# Patient Record
Sex: MALE | Race: WHITE | NOT HISPANIC OR LATINO | Employment: FULL TIME | URBAN - METROPOLITAN AREA
[De-identification: names, ages, dates, MRNs, and addresses within clinical notes are randomized per-mention and may not be internally consistent; named-entity substitution may affect disease eponyms.]

---

## 2018-06-10 ENCOUNTER — HOSPITAL ENCOUNTER (EMERGENCY)
Facility: HOSPITAL | Age: 48
End: 2018-06-11
Attending: EMERGENCY MEDICINE | Admitting: EMERGENCY MEDICINE
Payer: COMMERCIAL

## 2018-06-10 DIAGNOSIS — R45.851 SUICIDAL THOUGHTS: Primary | ICD-10-CM

## 2018-06-10 DIAGNOSIS — F31.9 BIPOLAR DISORDER (HCC): ICD-10-CM

## 2018-06-10 NOTE — LETTER
Section I - General Information    Name of Patient: Sourav Coffey                 : 1970    Medicare #:____________________  Transport Date: 18 (PCS is valid for round trips on this date and for all repetitive trips in the 60-day range as noted below )  Origin: [place] [address]                                                         Destination:________________________________________________  Is the pt's stay covered under Medicare Part A (PPS/DRG)     (_) YES  (_) NO  Closest appropriate facility?  (_) YES  (_) NO  If no, why is transport to more distant facility required?________________________  If hosp-hosp transfer, describe services needed at 2nd facility not available at 1st facility? _________________________________  If hospice pt, is this transport related to pt's terminal illness? (_) YES (_) NO Describe____________________________________    Section II - Medical Necessity Questionnaire  Ambulance transportation is medically necessary only if other means of transport are contraindicated or would be potentially harmful to the patient  To meet this requirement, the patient must either be "bed confined" or suffer from a condition such that transport by means other than ambulance is contraindicated by the patient's condition   The following questions must be answered by the medical professional signing below for this form to be valid:    1)  Describe the MEDICAL CONDITION (physical and/or mental) of this patient AT [address] [place] [address] that requires the patient to be transported in an ambulance and why transport by other means is contraindicated by the patient's condition:__________________________________________________________________________________________________    2) Is the patient "bed confined" as defined below?     (_) YES  (_) NO  To be "be confined" the patient must satisfy all three of the following conditions: (1) unable to get up from bed without Assistance; AND (2) unable to ambulate; AND (3) unable to sit in a chair or wheelchair  3) Can this patient safely be transported by car or wheelchair Rohan Corado (i e , seated during transport without a medical attendant or monitoring)?   (_) YES  (_) NO    4) In addition to completing questions 1-3 above, please check any of the following conditions that apply*:  *Note: supporting documentation for any boxes checked must be maintained in the patient's medical records  (_)Contractures   (_)Non-Healed Fractures  (_)Patient is confused (_)Patient is comatose (_)Moderate/severe pain on movement (_)Danger to self/others  (_)IV meds/fluids required (_)Patient is combative(_)Need or possible need for restraints (_)DVT requires elevation of lower extremity  (_)Medical attendant required (_)Requires oxygen-unable to self administer (_)Special handling/isolation/infection control precautions required (_)Unable to tolerate seated position for time needed to transport (_)Hemodynamic monitoring required en route (_)Unable to sit in a chair or wheelchair due to decubitus ulcers or other wounds (_)Cardiac monitoring required en route (_)Morbid obesity requires additional personnel/equipment to safely handle patient (_)Orthopedic device (backboard, halo, pins, traction, brace, wedge, etc,) requiring special handling during transport (_)Other(specify)_______________________________________________    Section III - Signature of Physician or Healthcare Professional  I certify that the above information is true and correct based on my evaluation of this patient, and represent that the patient requires transport by ambulance and that other forms of transport are contraindicated   I understand that this information will be used by the Centers for Medicare and Medicaid Services (CMS) to support the determination of medical necessity for ambulance services, and I represent that I have personal knowledge of the patient's condition at time of transport  (_) If this box is checked, I also certify that the patient is physically or mentally incapable of signing the ambulance service's claim and that the institution with which I am affiliated has furnished care, services, or assistance to the patient  My signature below is made on behalf of the patient pursuant to 42 CFR §424 36(b)(4)  In accordance with 42 CFR §424 37, the specific reason(s) that the patient is physically or mentally incapable of signing the claim form is as follows: _________________________________________________________________________________________________________      Signature of Physician* or Healthcare Professional______________________________________________________________  Signature Date 06/11/18 (For scheduled repetitive transports, this form is not valid for transports performed more than 60 days after this date)    Printed Name & Credentials of Physician or Healthcare Professional (MD, DO, RN, etc )________________________________  *Form must be signed by patient's attending physician for scheduled, repetitive transports   For non-repetitive, unscheduled ambulance transports, if unable to obtain the signature of the attending physician, any of the following may sign (choose appropriate option below)  (_) Physician Assistant (_)  Clinical Nurse Specialist (_)  Registered Nurse  (_)  Nurse Practitioner  (_) Discharge Planner

## 2018-06-10 NOTE — LETTER
[facility]  [address] [address]  Dept: [phone]      EMTALA TRANSFER CONSENT    NAME Latosha Alfred                                         1970                              MRN [id number]    I have been informed of my rights regarding examination, treatment, and transfer   by Dr Camilla Lee DO    Benefits: Specialized equipment and/or services available at the receiving facility (Include comment)________________________    Risks: Potential for delay in receiving treatment, Increased discomfort during transfer      Consent for Transfer:  I acknowledge that my medical condition has been evaluated and explained to me by the emergency department physician or other qualified medical person and/or my attending physician, who has recommended that I be transferred to the service of  Accepting Physician: DR Shubham Galicia at [address] [place] [address] Name, [address] : [facility] (SL[facility])  The above potential benefits of such transfer, the potential risks associated with such transfer, and the probable risks of not being transferred have been explained to me, and I fully understand them  The doctor has explained that, in my case, the benefits of transfer outweigh the risks  I agree to be transferred  I authorize the performance of emergency medical procedures and treatments upon me in both transit and upon arrival at the receiving facility  Additionally, I authorize the release of any and all medical records to the receiving facility and request they be transported with me, if possible  I understand that the safest mode of transportation during a medical emergency is an ambulance and that the Hospital advocates the use of this mode of transport   Risks of traveling to the receiving facility by car, including absence of medical control, life sustaining equipment, such as oxygen, and medical personnel has been explained to me and I fully understand them     ([facility])  [  ]  I consent to the stated transfer and to be transported by ambulance/helicopter  [  ]  I consent to the stated transfer, but refuse transportation by ambulance and accept full responsibility for my transportation by car  I understand the risks of non-ambulance transfers and I exonerate the Hospital and its staff from any deterioration in my condition that results from this refusal     X___________________________________________    DATE  18  TIME________  Signature of patient or legally responsible individual signing on patient behalf           RELATIONSHIP TO PATIENT_________________________          Provider Certification    NAME Erna Johns                                         1970                              MRN [id number]    A medical screening exam was performed on the above named patient  Based on the examination:    Condition Necessitating Transfer The primary encounter diagnosis was Suicidal thoughts  A diagnosis of Bipolar disorder ([facility] [address] ) was also pertinent to this visit  Patient Condition: The patient has been stabilized such that within reasonable medical probability, no material deterioration of the patient condition or the condition of the unborn child(sylvester) is likely to result from the transfer    Reason for Transfer: Level of Care needed not available at this facility    Transfer Requirements: Rick Blair (SP7)   · Space available and qualified personnel available for treatment as acknowledged by [facility] ([address])  · Agreed to accept transfer and to provide appropriate medical treatment as acknowledged by       DR Raisa Alvarado  · Appropriate medical records of the examination and treatment of the patient are provided at the time of transfer   [address] _______  · Transfer will be performed by qualified personnel from [facility]  and appropriate transfer equipment as required, including the use of necessary and appropriate life support measures  Provider Certification: I have examined the patient and explained the following risks and benefits of being transferred/refusing transfer to the patient/family:  General risk, such as traffic hazards, adverse weather conditions, rough terrain or turbulence, possible failure of equipment (including vehicle or aircraft), or consequences of actions of persons outside the control of the transport personnel      Based on these reasonable risks and benefits to the patient and/or the unborn child(sylvester), and based upon the information available at the time of the patients examination, I certify that the medical benefits reasonably to be expected from the provision of appropriate medical treatments at another medical facility outweigh the increasing risks, if any, to the individuals medical condition, and in the case of labor to the unborn child, from effecting the transfer      X____________________________________________ DATE 06/11/18        TIME_______      ORIGINAL - SEND TO MEDICAL RECORDS   COPY - SEND WITH PATIENT DURING TRANSFER

## 2018-06-11 ENCOUNTER — HOSPITAL ENCOUNTER (INPATIENT)
Facility: HOSPITAL | Age: 48
LOS: 4 days | Discharge: HOME | DRG: 885 | End: 2018-06-15
Attending: PSYCHIATRY & NEUROLOGY | Admitting: PSYCHIATRY & NEUROLOGY
Payer: COMMERCIAL

## 2018-06-11 VITALS
RESPIRATION RATE: 16 BRPM | SYSTOLIC BLOOD PRESSURE: 120 MMHG | HEART RATE: 77 BPM | OXYGEN SATURATION: 98 % | DIASTOLIC BLOOD PRESSURE: 82 MMHG | TEMPERATURE: 97.7 F

## 2018-06-11 DIAGNOSIS — F31.9 BIPOLAR DISORDER (HCC): ICD-10-CM

## 2018-06-11 DIAGNOSIS — F32.2 MDD (MAJOR DEPRESSIVE DISORDER), SINGLE EPISODE, SEVERE , NO PSYCHOSIS (HCC): Primary | ICD-10-CM

## 2018-06-11 DIAGNOSIS — F17.200 NICOTINE DEPENDENCE: ICD-10-CM

## 2018-06-11 LAB
AMPHETAMINES SERPL QL SCN: NEGATIVE
BARBITURATES UR QL: NEGATIVE
BENZODIAZ UR QL: NEGATIVE
COCAINE UR QL: NEGATIVE
ETHANOL EXG-MCNC: 0 MG/DL
METHADONE UR QL: NEGATIVE
OPIATES UR QL SCN: NEGATIVE
PCP UR QL: NEGATIVE
THC UR QL: NEGATIVE

## 2018-06-11 PROCEDURE — 99285 EMERGENCY DEPT VISIT HI MDM: CPT

## 2018-06-11 PROCEDURE — 80307 DRUG TEST PRSMV CHEM ANLYZR: CPT | Performed by: NURSE PRACTITIONER

## 2018-06-11 PROCEDURE — 82075 ASSAY OF BREATH ETHANOL: CPT | Performed by: NURSE PRACTITIONER

## 2018-06-11 RX ORDER — BENZTROPINE MESYLATE 1 MG/ML
1 INJECTION INTRAMUSCULAR; INTRAVENOUS EVERY 6 HOURS PRN
Status: CANCELLED | OUTPATIENT
Start: 2018-06-11

## 2018-06-11 RX ORDER — RISPERIDONE 1 MG/1
1 TABLET, ORALLY DISINTEGRATING ORAL
Status: DISCONTINUED | OUTPATIENT
Start: 2018-06-11 | End: 2018-06-15 | Stop reason: HOSPADM

## 2018-06-11 RX ORDER — TRAZODONE HYDROCHLORIDE 50 MG/1
50 TABLET ORAL
Status: DISCONTINUED | OUTPATIENT
Start: 2018-06-11 | End: 2018-06-15 | Stop reason: HOSPADM

## 2018-06-11 RX ORDER — MAGNESIUM HYDROXIDE/ALUMINUM HYDROXICE/SIMETHICONE 120; 1200; 1200 MG/30ML; MG/30ML; MG/30ML
20 SUSPENSION ORAL EVERY 4 HOURS PRN
Status: CANCELLED | OUTPATIENT
Start: 2018-06-11

## 2018-06-11 RX ORDER — NICOTINE 21 MG/24HR
21 PATCH, TRANSDERMAL 24 HOURS TRANSDERMAL ONCE
Status: DISCONTINUED | OUTPATIENT
Start: 2018-06-11 | End: 2018-06-11 | Stop reason: HOSPADM

## 2018-06-11 RX ORDER — HYDROXYZINE HYDROCHLORIDE 25 MG/1
25 TABLET, FILM COATED ORAL EVERY 6 HOURS PRN
Status: DISCONTINUED | OUTPATIENT
Start: 2018-06-11 | End: 2018-06-15 | Stop reason: HOSPADM

## 2018-06-11 RX ORDER — ACETAMINOPHEN 325 MG/1
650 TABLET ORAL ONCE
Status: COMPLETED | OUTPATIENT
Start: 2018-06-11 | End: 2018-06-11

## 2018-06-11 RX ORDER — HYDROXYZINE HYDROCHLORIDE 25 MG/1
25 TABLET, FILM COATED ORAL EVERY 6 HOURS PRN
Status: CANCELLED | OUTPATIENT
Start: 2018-06-11

## 2018-06-11 RX ORDER — LORAZEPAM 2 MG/ML
1 INJECTION INTRAMUSCULAR EVERY 6 HOURS PRN
Status: CANCELLED | OUTPATIENT
Start: 2018-06-11

## 2018-06-11 RX ORDER — LORAZEPAM 1 MG/1
1 TABLET ORAL EVERY 8 HOURS PRN
Status: CANCELLED | OUTPATIENT
Start: 2018-06-11

## 2018-06-11 RX ORDER — TRAZODONE HYDROCHLORIDE 50 MG/1
50 TABLET ORAL
Status: CANCELLED | OUTPATIENT
Start: 2018-06-11

## 2018-06-11 RX ORDER — BENZTROPINE MESYLATE 1 MG/ML
1 INJECTION INTRAMUSCULAR; INTRAVENOUS EVERY 6 HOURS PRN
Status: DISCONTINUED | OUTPATIENT
Start: 2018-06-11 | End: 2018-06-15 | Stop reason: HOSPADM

## 2018-06-11 RX ORDER — OLANZAPINE 10 MG/1
10 TABLET ORAL
Status: DISCONTINUED | OUTPATIENT
Start: 2018-06-11 | End: 2018-06-15 | Stop reason: HOSPADM

## 2018-06-11 RX ORDER — RISPERIDONE 1 MG/1
1 TABLET, ORALLY DISINTEGRATING ORAL
Status: CANCELLED | OUTPATIENT
Start: 2018-06-11

## 2018-06-11 RX ORDER — LORAZEPAM 2 MG/ML
1 INJECTION INTRAMUSCULAR EVERY 6 HOURS PRN
Status: DISCONTINUED | OUTPATIENT
Start: 2018-06-11 | End: 2018-06-15 | Stop reason: HOSPADM

## 2018-06-11 RX ORDER — ACETAMINOPHEN 325 MG/1
650 TABLET ORAL EVERY 6 HOURS PRN
Status: CANCELLED | OUTPATIENT
Start: 2018-06-11

## 2018-06-11 RX ORDER — OLANZAPINE 10 MG/1
10 TABLET ORAL
Status: CANCELLED | OUTPATIENT
Start: 2018-06-11

## 2018-06-11 RX ORDER — HALOPERIDOL 5 MG
5 TABLET ORAL EVERY 8 HOURS PRN
Status: CANCELLED | OUTPATIENT
Start: 2018-06-11

## 2018-06-11 RX ORDER — OLANZAPINE 10 MG/1
10 INJECTION, POWDER, LYOPHILIZED, FOR SOLUTION INTRAMUSCULAR
Status: DISCONTINUED | OUTPATIENT
Start: 2018-06-11 | End: 2018-06-15 | Stop reason: HOSPADM

## 2018-06-11 RX ORDER — HALOPERIDOL 5 MG
5 TABLET ORAL EVERY 8 HOURS PRN
Status: DISCONTINUED | OUTPATIENT
Start: 2018-06-11 | End: 2018-06-15 | Stop reason: HOSPADM

## 2018-06-11 RX ORDER — BENZTROPINE MESYLATE 0.5 MG/1
1 TABLET ORAL EVERY 6 HOURS PRN
Status: CANCELLED | OUTPATIENT
Start: 2018-06-11

## 2018-06-11 RX ORDER — IBUPROFEN 600 MG/1
600 TABLET ORAL EVERY 8 HOURS PRN
Status: CANCELLED | OUTPATIENT
Start: 2018-06-11

## 2018-06-11 RX ORDER — IBUPROFEN 400 MG/1
800 TABLET ORAL EVERY 8 HOURS PRN
Status: DISCONTINUED | OUTPATIENT
Start: 2018-06-11 | End: 2018-06-15 | Stop reason: HOSPADM

## 2018-06-11 RX ORDER — ACETAMINOPHEN 325 MG/1
650 TABLET ORAL EVERY 6 HOURS PRN
Status: DISCONTINUED | OUTPATIENT
Start: 2018-06-11 | End: 2018-06-15 | Stop reason: HOSPADM

## 2018-06-11 RX ORDER — OLANZAPINE 10 MG/1
10 INJECTION, POWDER, LYOPHILIZED, FOR SOLUTION INTRAMUSCULAR
Status: CANCELLED | OUTPATIENT
Start: 2018-06-11

## 2018-06-11 RX ORDER — LORAZEPAM 1 MG/1
1 TABLET ORAL EVERY 8 HOURS PRN
Status: DISCONTINUED | OUTPATIENT
Start: 2018-06-11 | End: 2018-06-15 | Stop reason: HOSPADM

## 2018-06-11 RX ORDER — IBUPROFEN 600 MG/1
600 TABLET ORAL EVERY 8 HOURS PRN
Status: DISCONTINUED | OUTPATIENT
Start: 2018-06-11 | End: 2018-06-15 | Stop reason: HOSPADM

## 2018-06-11 RX ORDER — IBUPROFEN 400 MG/1
800 TABLET ORAL EVERY 8 HOURS PRN
Status: CANCELLED | OUTPATIENT
Start: 2018-06-11

## 2018-06-11 RX ORDER — HALOPERIDOL 5 MG/ML
5 INJECTION INTRAMUSCULAR EVERY 6 HOURS PRN
Status: DISCONTINUED | OUTPATIENT
Start: 2018-06-11 | End: 2018-06-15 | Stop reason: HOSPADM

## 2018-06-11 RX ORDER — MAGNESIUM HYDROXIDE/ALUMINUM HYDROXICE/SIMETHICONE 120; 1200; 1200 MG/30ML; MG/30ML; MG/30ML
20 SUSPENSION ORAL EVERY 4 HOURS PRN
Status: DISCONTINUED | OUTPATIENT
Start: 2018-06-11 | End: 2018-06-15 | Stop reason: HOSPADM

## 2018-06-11 RX ORDER — HALOPERIDOL 5 MG/ML
5 INJECTION INTRAMUSCULAR EVERY 6 HOURS PRN
Status: CANCELLED | OUTPATIENT
Start: 2018-06-11

## 2018-06-11 RX ORDER — BENZTROPINE MESYLATE 1 MG/1
1 TABLET ORAL EVERY 6 HOURS PRN
Status: DISCONTINUED | OUTPATIENT
Start: 2018-06-11 | End: 2018-06-15 | Stop reason: HOSPADM

## 2018-06-11 RX ADMIN — ACETAMINOPHEN 650 MG: 325 TABLET ORAL at 21:40

## 2018-06-11 RX ADMIN — ACETAMINOPHEN 650 MG: 325 TABLET ORAL at 09:23

## 2018-06-11 RX ADMIN — NICOTINE POLACRILEX 2 MG: 2 GUM, CHEWING ORAL at 21:44

## 2018-06-11 RX ADMIN — NICOTINE 21 MG: 21 PATCH, EXTENDED RELEASE TRANSDERMAL at 01:05

## 2018-06-11 NOTE — ED NOTES
Spoke with [facility] who states there are no available beds at this time and another patient was not discharged as anticipated

## 2018-06-11 NOTE — ED NOTES
Patient was brought to the ED tonight by [employer] who were called after pt's girlfriend reported that he made suicidal threats in a text message earlier  He reports that he and his wife are  and haven't spoken in a long time  He says he was frustrated by this and sent a text to his ex gf saying that he was going to get a gun and do what a friend did  The friend reportedly shot himself  Pt reports having a gun that is being kept in [place]  He denies feeling suicidal now, and also denies HI and psychosis  He says he has no mental health history since high school

## 2018-06-11 NOTE — DISCHARGE INSTRUCTIONS
Suicide Prevention for Adults   WHAT YOU NEED TO KNOW:   A person may see suicide as the only way to escape emotional or physical pain and suffering  You can help provide emotional support for him or her and get the help he or she needs  Learn to recognize warning signs that the person may be considering suicide  Find resources to help prevent him or her from attempting to take his or her life  DISCHARGE INSTRUCTIONS:   Call 911 if:   · The person has done something on purpose to hurt himself or herself  · The person tries to commit suicide  Return to the emergency department if:   · The person tells you he or she made a plan to commit suicide  · The person acts out in anger, is reckless, or is abusing alcohol or drugs  · The person has serious thoughts of suicide, even with treatment  Contact the person's healthcare provider or therapist if:   · You begin to see warning signs that the person may be considering suicide  · The person has intense feelings of sadness, anger, revenge, or despair, or he cannot make decisions easily  · The person tells you he or she has more thoughts of suicide when they are alone  · The person withdraws from others  · The person stops eating, or begins to smoke or drink heavily  · The person feels he or she is a burden because of a disability or disease  · The person has trouble dealing with stress, such as a breakup or a job loss  · You have questions or concerns about the person's condition or care  What to do if the person is having thoughts of suicide:  Call 915 if you feel the person is at immediate risk of suicide, or if he or she talks about an active suicide plan  Assume that the person intends to carry out his or her plan  Resources are available to help you and the person  The following are some things you can do:  · Call the [address] [place] [address] at 5-321-386-TALK ([phone])       · Call the Suicide Hotline at 3-649-BEKMDKL (1-260.239.3191)   · Contact the person's therapist  His or her healthcare provider can give you a list of therapists if he or she does not have one  · Keep medicines, weapons, and alcohol out of the person's reach  Do not leave the person alone if he or she says they want to commit suicide  Do not leave the person alone if you think he or she may try it  Make sure you do not put yourself at risk if the person has a weapon  · Do not be afraid to ask if the person is thinking of ending his or her life  Ask if the person has a plan for hurting or killing himself or herself  Warning signs to watch for:   · Talking about a plan for committing suicide, or suddenly deciding to make a will    · Cutting himself or herself, burning the skin with cigarettes, or driving recklessly    · Drug or alcohol use, not taking prescribed medicine, or taking too much prescribed medicine    · Sudden anger, lashing out at others, or seeming hopeless, anxious, or angry and then suddenly becoming happy or peaceful    · Not wanting to spend time with others or doing things he or she usually enjoys    · Trouble at work, or not showing up for work    · A change in the way he or she eats, sleeps, or dresses    · Weight gain or loss or having less energy than usual    · Trouble sleeping or spending a lot of time sleeping    · Giving away or throwing away his or her belongings  Follow up with the person's healthcare provider and therapist as directed:  Write down your questions so you remember to ask them during your visits  Treatments the person may need:   · Medicines  may be given to prevent mood swings, or to decrease anxiety or depression  The person will need to take all medicines as directed  A sudden stop can be harmful  It may take 4 to 6 weeks for the medicine to help him or her feel better  · A therapist  can help the person identify and change negative feelings or beliefs about himself or herself   This may also help change the way the he or she feels and acts  A therapist can also help the person find ways to cope with things that cannot be changed  What you can do to help the person:   · Encourage the person to seek help for drug or alcohol abuse  Drugs and alcohol can increase suicidal thoughts and make the person more likely to act on them  · Help the person connect with others  Encourage him or her to become involved in the community  Some examples include tutoring a young student, volunteering at a [employer], or joining a group exercise program  The person may need help setting up a computer or creating an e-mail account to help him or her remain connected to others  · Exercise with the person  Exercise can lift his or her mood, increase energy, and make it easier to sleep at night  · Encourage the person to try new things  Adults who are open to new experiences handle stress and change better than those who are not  · Call, visit, or send postcards to the person often  Check on him or her after the loss of a pet, longtime friend, or child  Holidays, birthdays, and anniversaries can be difficult for a person after a loss  The loss of a spouse can be especially painful and lonely  · Help the person schedule a visit with his or her Druze or spiritual leader  A Druze or spiritual leader may be able to offer additional support and resources to the person  · Help the person get equipment that will increase his or her comfort and mobility  Examples are hearing aids, glasses, large print books, and walkers  These can help him or her enjoy activities and feel more independent  · Encourage the person to continue taking medicine and going to therapy  Medicine and therapy can help improve his or her mental health    For support and more information:   · [address] [place] [address] , [address] [place] [address]  Phone: 1- 450 - 860-MCAO 03 51 58 72 24)  Web Address: [employer] be  org  · Suicide Awareness Voices of Education  [address] , [address]  Phone: 0- 906 - 307-2109  Web Address: [employer] br  org  © 2017 [address] [employer] [employer] Information is for End User's use only and may not be sold, redistributed or otherwise used for commercial purposes  All illustrations and images included in CareNotes® are the copyrighted property of [employer] , [employer]  or [employer]  The above information is an  only  It is not intended as medical advice for individual conditions or treatments  Talk to your doctor, nurse or pharmacist before following any medical regimen to see if it is safe and effective for you

## 2018-06-11 NOTE — ED NOTES
Crisis worker at [address] [place] [address], [address] [place] [address]  06/11/18 5440

## 2018-06-11 NOTE — ED NOTES
Patient provided with call bell to watch TV, patient is 1:1 continuous observation        Vishal Ling RN  06/11/18 2309

## 2018-06-11 NOTE — ED NOTES
Verbal consent obtained from patient, to talk to patients wife, Heladio Medina looking for update on patient  RN able to provide Heladio Medina with update at this time  Heladio Medina not interested in talking with patient        Liliana Giraldo RN  06/11/18 0612

## 2018-06-11 NOTE — ED NOTES
Verbal consent obtained from patient to talk to patients daughter  Patients daughter looking for update at this time  Provided daughter with update  Daughter is also not interested in speaking with patient at this time        Liliana Giraldo RN  06/11/18 2793

## 2018-06-11 NOTE — ED NOTES
Patient is accepted at [address] [facility])   Patient is accepted by Dr Shelle Dakins per [facility])    Transportation is arranged with [facility]  Transportation is scheduled for 06/11/2018 @1900  Patient may go to the floor at [address] [facility] [facility])  Nurse report is to be called to [phone] prior to patient transfer      [address] [place] [address] Worker

## 2018-06-11 NOTE — ED NOTES
Called the number for admissions on the back of the patient's insurance card and received an automated message stating the office was closed, and would reopen at 08:30  Preauthorization for hospitaliztion could not be obtained  Another call will need to be placed to [phone] after 08:30 to obtain authorization for acute inpatient mental health treatment

## 2018-06-11 NOTE — ED ATTENDING ATTESTATION
Bradley Preciado DO, saw and evaluated the patient  I have discussed the patient with the resident/non-physician practitioner and agree with the resident's/non-physician practitioner's findings, Plan of Care, and MDM as documented in the resident's/non-physician practitioner's note, except where noted  All available labs and Radiology studies were reviewed  At this point I agree with the current assessment done in the Emergency Department  I have conducted an independent evaluation of this patient a history and physical is as follows:    Patient is a 52-year-old male that presents for a psychiatric evaluation tonight  Crisis was called after somebody called the police tonight because of a text message  He states that he has been cheating on his wife and he sent a text message to his girlfriend tonight stating that he but a gun and was tired of everything and made a threat about doing something that was similar to what another friend did  That other friend shot himself  Police arrived along with crisis  Patient's daughter and girlfriend were willing to sign a [id number] but the patient was brought to the hospital 1st for an evaluation  According to the police he never stated that he would sign a 201  A petition was never taken from crisis  We are unable to get a hold of the patient's girlfriend or his daughter  Patient states that he is not suicidal but does admit to making these text messages  Patient is alert and oriented x3  He has a blunted affect  He denies any suicidal or homicidal ideation  He has no plan  States that he said when he said in anger  He does not appear impaired at the time  He is in no respiratory distress  Has a regular heart rate and heart sounds  Neurologically is grossly intact  We had a long discussion with the patient about our concerns  We are unable to get hold of the patient's daughter or girlfriend    Given his text message that we are able to read on his phone is concerning enough that we would support a 302  We explained this to him  He understands and is willing to sign a 201  Will have crisis speak with him again here from the emergency department to sign a 201 and arrange placement    Critical Care Time  CritCare Time    Procedures

## 2018-06-11 NOTE — ED NOTES
Patient signed a 12  Bed search was unable to locate an appropriate inpatient provider   The following facilities have no available bed tonight:  [facility], [facility], [facility], [facility],  [facility] [place], [facility], [facility]

## 2018-06-11 NOTE — ED NOTES
Per Stacia Loud, patients wife, patient very manipulative and "playing his games again " Patient called and left voicemail saying that patient "pretended to be a nurse" by stating that "her  was in [facility] and she needed to call back and speak with a nurse because he is being transferred to [facility]      Wale Baez RN  06/11/18 4837

## 2018-06-11 NOTE — ED PROVIDER NOTES
History  Chief Complaint   Patient presents with    Psychiatric Evaluation     Patient reports depression for 2 months  States texted wife today stating that he purchased a gun and might do something stupid if he isnt able to talk to her  States sometimes SI but I dont feel like I would do it  Denies plan, HI/AH/VH  This is a 56year old male who states that he and his wife have not spoken in the last 2 months as their marriage is stressed  He states that he went to "the courthouse to take a polygraph test and this set everything off"  He states that earlier he sent an instant message to his ex girl friend "kelsi"  His message stated that "I bought a gun, I cant take this anymore, I am going to do something stupid like _________ did"  Pt states that crisis and the police arrived at his house  Pt states that he was told by crisis either "he can go willingly or they can force him to go"  Pt states that he was not told that he had the option of signing himself in or had to be committed  Pt brought to ED by [employer]  Crisis states they received phone call that pt daughter could sign 302   302 was not done while crisis was at home  Pt denies that he is suicidal  He states that his comment was "spur of the moment"  He states he went to bed, cooled off and there was nothing to it  He states that the gun is "in [place]"  He denies that anyone knows where it is located  History provided by:  Patient   used: No    Psychiatric Evaluation   Presenting symptoms: suicidal thoughts and suicidal threats    Presenting symptoms: no suicide attempt    Patient accompanied by:  Law enforcement      Prior to Admission Medications   Prescriptions Last Dose Informant Patient Reported? Taking?   naproxen (NAPROSYN) 500 mg tablet   No No   Sig: Take 1 tablet (500 mg total) by mouth 3 (three) times a day with meals        Facility-Administered Medications: None       History reviewed  No pertinent past medical history  Past Surgical History:   Procedure Laterality Date    EYE SURGERY Left     HERNIA REPAIR         History reviewed  No pertinent family history  I have reviewed and agree with the history as documented  Social History   Substance Use Topics    Smoking status: Current Every Day Smoker     Packs/day: 2 00    Smokeless tobacco: Never Used    Alcohol use No        Review of Systems   Psychiatric/Behavioral: Positive for suicidal ideas  All other systems reviewed and are negative  Physical Exam  Physical Exam   Constitutional: He is oriented to person, place, and time  He appears well-developed and well-nourished  No distress  HENT:   Head: Normocephalic and atraumatic  Eyes: EOM are normal  Pupils are equal, round, and reactive to light  Neck: Normal range of motion  Neck supple  Cardiovascular: Normal rate, regular rhythm and normal heart sounds  Pulmonary/Chest: Effort normal and breath sounds normal    Abdominal: Soft  Bowel sounds are normal  He exhibits no distension  There is no tenderness  Musculoskeletal: Normal range of motion  Neurological: He is alert and oriented to person, place, and time  He displays normal reflexes  No cranial nerve deficit or sensory deficit  He exhibits normal muscle tone  Coordination normal    Skin: Skin is warm and dry  Capillary refill takes less than 2 seconds  He is not diaphoretic  Psychiatric:   Pt denies SI at this time  Passive - regarding situation and consequences    Nursing note and vitals reviewed        Vital Signs  ED Triage Vitals [06/10/18 2355]   Temperature Pulse Respirations Blood Pressure SpO2   98 6 °F (37 °C) 66 18 145/93 97 %      Temp Source Heart Rate Source Patient Position - Orthostatic VS BP Location FiO2 (%)   Oral Monitor Sitting Left arm --      Pain Score       No Pain           Vitals:    06/10/18 2355   BP: 145/93   Pulse: 66   Patient Position - Orthostatic VS: Sitting Visual Acuity      ED Medications  Medications   nicotine (NICODERM CQ) 21 mg/24 hr TD 24 hr patch 21 mg (21 mg Transdermal Medication Applied 6/11/18 0105)       Diagnostic Studies  Results Reviewed     Procedure Component Value Units Date/Time    Rapid drug screen, urine [[id number]]  (Normal) Collected:  06/11/18 0059    Lab Status:  Final result Specimen:  Urine from Urine, Clean Catch Updated:  06/11/18 0119     Amph/Meth UR Negative     Barbiturate Ur Negative     Benzodiazepine Urine Negative     Cocaine Urine Negative     Methadone Urine Negative     Opiate Urine Negative     PCP Ur Negative     THC Urine Negative    Narrative:         FOR MEDICAL PURPOSES ONLY  IF CONFIRMATION NEEDED PLEASE CONTACT THE LAB WITHIN 5 DAYS  Drug Screen Cutoff Levels:  AMPHETAMINE/METHAMPHETAMINES  1000 ng/mL  BARBITURATES     200 ng/mL  BENZODIAZEPINES     200 ng/mL  COCAINE      300 ng/mL  METHADONE      300 ng/mL  OPIATES      300 ng/mL  PHENCYCLIDINE     25 ng/mL  THC       50 ng/mL    POCT alcohol breath test [[id number]]  (Normal) Resulted:  06/11/18 0030    Lab Status:  Final result Updated:  06/11/18 0058     EXTBreath Alcohol 0 00                 No orders to display              Procedures  Procedures       Phone Contacts  ED Phone Contact    ED Course  ED Course as of Jun 11 0619 Mon Jun 11, 2018   0121 UDS and Breath alcohol negative  Pt will remain in ED for night  0453 Pt has been resting most of night  No behavior problems  9901 Report to oncoming day shift physician for dispo  MDM  Number of Diagnoses or Management Options  Diagnosis management comments: Suicidal ideation/gestures    Denies SI at time of assessment  Currently refuses to sign 201  Explained voluntary 201 vs commitment 302 to patient  Pt continued to be passive      Informed pt that due to the instant message visualized by NP that I could 302 him due to comments of being a threat/harm to himself  Pt surprised that medical professional could 302 him  Consequences of 302 explained in detail with pt and he has opted to sign 201  Discussed with Ale Alcala that pt is willing to sign 201  Amount and/or Complexity of Data Reviewed  Clinical lab tests: ordered and reviewed  Review and summarize past medical records: yes      CritCare Time    Disposition  Final diagnoses:   Suicidal thoughts     Time reflects when diagnosis was documented in both MDM as applicable and the Disposition within this note     Time User Action Codes Description Comment    6/11/2018  6:18 AM Bridgette, [address] [facility] Suicidal thoughts       ED Disposition     None      MD Documentation      Most Recent Value   Sending MD Bety Burk      Follow-up Information     Follow up With Specialties Details Why Contact Info Additional [facility] [place] [facility] Schedule an appointment as soon as possible for a visit in 2 days  [address] [address] [phone]       Wisam Garza   to find a -[phone] [address] [facility] Emergency Department Emergency Medicine  If symptoms worsen [address] [facility]  [phone] [place] ED, [address] , [facility], [address] [facility], [address]          Patient's Medications   Discharge Prescriptions    No medications on file     No discharge procedures on file      ED Provider  Electronically Signed by           Maikel Roger  06/11/18 7555

## 2018-06-11 NOTE — ED NOTES
Patient confused about plan of treatment and the "next steps " Patient continues to state, "so you're sending me somewhere I don't want to go?" Crisis worker aware and states she will be in shortly so see patient        Basia Montes RN  06/11/18 9645

## 2018-06-12 PROBLEM — F32.2 MDD (MAJOR DEPRESSIVE DISORDER), SINGLE EPISODE, SEVERE , NO PSYCHOSIS (HCC): Status: ACTIVE | Noted: 2018-06-12

## 2018-06-12 LAB
ALBUMIN SERPL BCP-MCNC: 3.2 G/DL (ref 3.5–5)
ALP SERPL-CCNC: 84 U/L (ref 46–116)
ALT SERPL W P-5'-P-CCNC: 18 U/L (ref 12–78)
ANION GAP SERPL CALCULATED.3IONS-SCNC: 6 MMOL/L (ref 4–13)
AST SERPL W P-5'-P-CCNC: 14 U/L (ref 5–45)
BASOPHILS # BLD AUTO: 0.1 THOUSANDS/ΜL (ref 0–0.1)
BASOPHILS NFR BLD AUTO: 1 % (ref 0–1)
BILIRUB SERPL-MCNC: 0.51 MG/DL (ref 0.2–1)
BUN SERPL-MCNC: 13 MG/DL (ref 5–25)
CALCIUM SERPL-MCNC: 8.8 MG/DL (ref 8.3–10.1)
CHLORIDE SERPL-SCNC: 105 MMOL/L (ref 100–108)
CHOLEST SERPL-MCNC: 162 MG/DL (ref 50–200)
CO2 SERPL-SCNC: 26 MMOL/L (ref 21–32)
CREAT SERPL-MCNC: 0.68 MG/DL (ref 0.6–1.3)
EOSINOPHIL # BLD AUTO: 0.4 THOUSAND/ΜL (ref 0–0.61)
EOSINOPHIL NFR BLD AUTO: 4 % (ref 0–6)
ERYTHROCYTE [DISTWIDTH] IN BLOOD BY AUTOMATED COUNT: 12.9 % (ref 11.6–15.1)
GFR SERPL CREATININE-BSD FRML MDRD: 115 ML/MIN/1.73SQ M
GLUCOSE P FAST SERPL-MCNC: 100 MG/DL (ref 65–99)
GLUCOSE SERPL-MCNC: 100 MG/DL (ref 65–140)
HCT VFR BLD AUTO: 50.1 % (ref 36.5–49.3)
HDLC SERPL-MCNC: 44 MG/DL (ref 40–60)
HGB BLD-MCNC: 16.5 G/DL (ref 12–17)
IMM GRANULOCYTES # BLD AUTO: 0.04 THOUSAND/UL (ref 0–0.2)
IMM GRANULOCYTES NFR BLD AUTO: 0 % (ref 0–2)
LDLC SERPL CALC-MCNC: 94 MG/DL (ref 0–100)
LYMPHOCYTES # BLD AUTO: 3.75 THOUSANDS/ΜL (ref 0.6–4.47)
LYMPHOCYTES NFR BLD AUTO: 39 % (ref 14–44)
MAGNESIUM SERPL-MCNC: 2.4 MG/DL (ref 1.6–2.6)
MCH RBC QN AUTO: 29.3 PG (ref 26.8–34.3)
MCHC RBC AUTO-ENTMCNC: 32.9 G/DL (ref 31.4–37.4)
MCV RBC AUTO: 89 FL (ref 82–98)
MONOCYTES # BLD AUTO: 0.85 THOUSAND/ΜL (ref 0.17–1.22)
MONOCYTES NFR BLD AUTO: 9 % (ref 4–12)
NEUTROPHILS # BLD AUTO: 4.47 THOUSANDS/ΜL (ref 1.85–7.62)
NEUTS SEG NFR BLD AUTO: 47 % (ref 43–75)
NONHDLC SERPL-MCNC: 118 MG/DL
NRBC BLD AUTO-RTO: 0 /100 WBCS
PLATELET # BLD AUTO: 313 THOUSANDS/UL (ref 149–390)
PMV BLD AUTO: 9.8 FL (ref 8.9–12.7)
POTASSIUM SERPL-SCNC: 4.2 MMOL/L (ref 3.5–5.3)
PROT SERPL-MCNC: 6.9 G/DL (ref 6.4–8.2)
RBC # BLD AUTO: 5.64 MILLION/UL (ref 3.88–5.62)
RPR SER QL: NORMAL
SODIUM SERPL-SCNC: 137 MMOL/L (ref 136–145)
TRIGL SERPL-MCNC: 122 MG/DL
TSH SERPL DL<=0.05 MIU/L-ACNC: 3.39 UIU/ML (ref 0.36–3.74)
WBC # BLD AUTO: 9.61 THOUSAND/UL (ref 4.31–10.16)

## 2018-06-12 PROCEDURE — 80053 COMPREHEN METABOLIC PANEL: CPT | Performed by: PSYCHIATRY & NEUROLOGY

## 2018-06-12 PROCEDURE — 86592 SYPHILIS TEST NON-TREP QUAL: CPT | Performed by: PSYCHIATRY & NEUROLOGY

## 2018-06-12 PROCEDURE — 83735 ASSAY OF MAGNESIUM: CPT | Performed by: PSYCHIATRY & NEUROLOGY

## 2018-06-12 PROCEDURE — 99252 IP/OBS CONSLTJ NEW/EST SF 35: CPT | Performed by: PHYSICIAN ASSISTANT

## 2018-06-12 PROCEDURE — 85025 COMPLETE CBC W/AUTO DIFF WBC: CPT | Performed by: PSYCHIATRY & NEUROLOGY

## 2018-06-12 PROCEDURE — 80061 LIPID PANEL: CPT | Performed by: PSYCHIATRY & NEUROLOGY

## 2018-06-12 PROCEDURE — 99223 1ST HOSP IP/OBS HIGH 75: CPT | Performed by: NURSE PRACTITIONER

## 2018-06-12 PROCEDURE — 84443 ASSAY THYROID STIM HORMONE: CPT | Performed by: PSYCHIATRY & NEUROLOGY

## 2018-06-12 RX ORDER — NICOTINE 21 MG/24HR
21 PATCH, TRANSDERMAL 24 HOURS TRANSDERMAL DAILY
Status: DISCONTINUED | OUTPATIENT
Start: 2018-06-12 | End: 2018-06-15 | Stop reason: HOSPADM

## 2018-06-12 RX ADMIN — IBUPROFEN 800 MG: 400 TABLET, FILM COATED ORAL at 19:44

## 2018-06-12 RX ADMIN — NICOTINE 21 MG: 21 PATCH, EXTENDED RELEASE TRANSDERMAL at 10:28

## 2018-06-12 RX ADMIN — NICOTINE POLACRILEX 2 MG: 2 GUM, CHEWING ORAL at 19:42

## 2018-06-12 RX ADMIN — NICOTINE POLACRILEX 2 MG: 2 GUM, CHEWING ORAL at 22:53

## 2018-06-12 RX ADMIN — NICOTINE POLACRILEX 2 MG: 2 GUM, CHEWING ORAL at 17:01

## 2018-06-12 RX ADMIN — NICOTINE POLACRILEX 2 MG: 2 GUM, CHEWING ORAL at 08:09

## 2018-06-12 RX ADMIN — NICOTINE POLACRILEX 2 MG: 2 GUM, CHEWING ORAL at 06:10

## 2018-06-12 RX ADMIN — IBUPROFEN 600 MG: 600 TABLET ORAL at 08:07
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
## 2018-06-12 NOTE — CASE MANAGEMENT
Patient admitted 6/11/2018 on a 201  Patient reports he text his ex girlfriend a statement stating, " I bought a gun and if my wife doesn't call me by the end of the night I'd do what my cousin did"  Patient said 30 years ago his cousin committed suicide by a gun  Radha Wade said he would not act on this statement and said " I was told to write my thoughts down to express myself and it was only thoughts"  Radha Mauro states he lives with his daughter and 3year old grand daughter and said he plans to return when discharged  He said he has been  almost a year and  the last 2 months  He reports one past inpatient as a teen at [address] [place] [address] and then went into a rehab program  He reports he has been clean and sober for 19 years  Radha Wade said he does not have a PCP and does not have outpatient [facility] providers  He denies legal and denies drug and alcohol  UDS was negative, AUDIT 0/40  Radha Wade states he is employed full time at [employer] and is a   He signed 72 hour notice today at 0800  Radha Wade denies owning a firearm  CM reviewed the treatment plan, patient agreeable and did sign  He is agreeable to referral for outpatient therapy but told CM he does not want to take medications
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
## 2018-06-12 NOTE — CASE MANAGEMENT
CM referred patient to [facility] for outpt [address] services  Intake is scheduled on June 19th at 1630   ( Noted in AVS)

## 2018-06-12 NOTE — CONSULTS
H&P Exam - Lisbeth Jones 56 y o  male MRN: [id number]  Unit/Bed#: ZL6 028-36 Encounter: [id number]    Assessment:  55 yo male with suicidal thought     Plan:  No medical intervention at this time  Psychiatric care per behavioral health    Hospital Problems:  Active Problems:    * No active hospital problems  *      History of Present Illness   Lisbeth Jones is a 55 yo male that was brought into the \[facility]\"" ED by the police  Pt texted ex-girlfriend stating that he had a gun and that if she doesn't call him by the end of the night he might do something stupid like his cousin did  Pt currently denies any suicidal/homicidal thoughts  Pt denies any past medical history  States that he has had two surgeries for hernia repair  He denies any medical complaints at this time  Just states that he wants his nicotine patch today  Historical Information   History reviewed  No pertinent past medical history  Past Surgical History:   Procedure Laterality Date    EYE SURGERY Left     HERNIA REPAIR       Social History   History   Alcohol Use No     History   Drug Use No     History   Smoking Status    Current Every Day Smoker    Packs/day: 2 00   Smokeless Tobacco    Never Used     Family History: non-contributory    Meds/Allergies   No prescriptions prior to admission         Current Facility-Administered Medications:     acetaminophen (TYLENOL) tablet 650 mg, 650 mg, Oral, Q6H PRN, Irish Osler, MD, 650 mg at 06/11/18 2140    aluminum-magnesium hydroxide-simethicone (MYLANTA) 200-200-20 mg/5 mL oral suspension 20 mL, 20 mL, Oral, Q4H PRN, Irish Osler, MD    benztropine (COGENTIN) injection 1 mg, 1 mg, Intramuscular, Q6H PRN, Irish Osler, MD    benztropine (COGENTIN) tablet 1 mg, 1 mg, Oral, Q6H PRN, Irish Osler, MD    haloperidol (HALDOL) tablet 5 mg, 5 mg, Oral, Q8H PRN, Irish Osler, MD    haloperidol lactate (HALDOL) injection 5 mg, 5 mg, Intramuscular, Q6H PRN, Irish Osler, MD Gardiner Joseph hydrOXYzine HCL (ATARAX) tablet 25 mg, 25 mg, Oral, Q6H PRN, Akil Dominguez MD    ibuprofen (MOTRIN) tablet 600 mg, 600 mg, Oral, Q8H PRN, Akil Dominguez MD, 600 mg at 06/12/18 0807    ibuprofen (MOTRIN) tablet 800 mg, 800 mg, Oral, Q8H PRN, Akil Dominguez MD    LORazepam (ATIVAN) 2 mg/mL injection 1 mg, 1 mg, Intramuscular, Q6H PRN, Akil Dominguez MD    LORazepam (ATIVAN) tablet 1 mg, 1 mg, Oral, Q8H PRN, Akil Dominguez MD    magnesium hydroxide (MILK OF MAGNESIA) 400 mg/5 mL oral suspension 30 mL, 30 mL, Oral, Daily PRN, MD Cassia Goldman  nicotine (NICODERM CQ) 21 mg/24 hr TD 24 hr patch 21 mg, 21 mg, Transdermal, Daily, HYACINTH Funk, 21 mg at 06/12/18 1028    nicotine polacrilex (NICORETTE) gum 2 mg, 2 mg, Oral, Q2H PRN, Akil Dominguez MD, 2 mg at 06/12/18 0809    OLANZapine (ZyPREXA) IM injection 10 mg, 10 mg, Intramuscular, Q3H PRN, Akil Dominguez MD    OLANZapine (ZyPREXA) tablet 10 mg, 10 mg, Oral, Q3H PRN, Akil Dominguez MD    risperiDONE (RisperDAL M-TABS) dispersible tablet 1 mg, 1 mg, Oral, Q3H PRN, Akil Dominguez MD    traZODone (DESYREL) tablet 50 mg, 50 mg, Oral, HS PRN, Akil Dominguez MD  No Known Allergies    Objective   First Vitals:   Blood Pressure: 129/80 (06/11/18 1945)  Pulse: 60 (06/11/18 1945)  Temperature: 98 °F (36 7 °C) (06/11/18 1945)  Temp Source: Oral (06/11/18 1945)  Respirations: 16 (06/11/18 1945)  Height: 5' 11" (180 3 cm) (06/11/18 1945)  Weight - Scale: 76 6 kg (168 lb 12 8 oz) (06/11/18 1945)    Current Vitals:   Blood Pressure: 111/69 (06/12/18 0735)  Pulse: (!) 52 (06/12/18 0735)  Temperature: 97 9 °F (36 6 °C) (06/12/18 0735)  Temp Source: Oral (06/12/18 0735)  Respirations: 18 (06/12/18 0735)  Height: 5' 11" (180 3 cm) (06/11/18 1945)  Weight - Scale: 76 6 kg (168 lb 12 8 oz) (06/11/18 1945)    No intake or output data in the 24 hours ending 06/12/18 1037    Invasive Devices          No matching active lines, drains, or airways          Review of Systems   Constitutional: Negative for chills and fever  HENT: Negative for congestion, rhinorrhea and sinus pressure  Respiratory: Negative for shortness of breath and wheezing  Cardiovascular: Negative for chest pain and palpitations  Gastrointestinal: Negative for abdominal pain, constipation, diarrhea, nausea and vomiting  Genitourinary: Negative  Neurological: Negative for dizziness, light-headedness and headaches  Psychiatric/Behavioral: Negative for suicidal ideas  The patient is not nervous/anxious  Physical Exam   Constitutional: He is oriented to person, place, and time  He appears well-developed and well-nourished  No distress  HENT:   Head: Normocephalic and atraumatic  Cardiovascular: Normal rate and regular rhythm  Pulmonary/Chest: Effort normal and breath sounds normal  No respiratory distress  Abdominal: Soft  There is no tenderness  Musculoskeletal: Normal range of motion  He exhibits no edema  Neurological: He is alert and oriented to person, place, and time  No cranial nerve deficit  Skin: Skin is warm and dry         Lab Results:   Recent Results (from the past 24 hour(s))   CBC and differential    Collection Time: 06/12/18  6:08 AM   Result Value Ref Range    WBC 9 61 4 31 - 10 16 Thousand/uL    RBC 5 64 (H) 3 88 - 5 62 Million/uL    Hemoglobin 16 5 12 0 - 17 0 g/dL    Hematocrit 50 1 (H) 36 5 - 49 3 %    MCV 89 82 - 98 fL    MCH 29 3 26 8 - 34 3 pg    MCHC 32 9 31 4 - 37 4 g/dL    RDW 12 9 11 6 - 15 1 %    MPV 9 8 8 9 - 12 7 fL    Platelets 112 271 - 468 Thousands/uL    nRBC 0 /100 WBCs    Neutrophils Relative 47 43 - 75 %    Immat GRANS % 0 0 - 2 %    Lymphocytes Relative 39 14 - 44 %    Monocytes Relative 9 4 - 12 %    Eosinophils Relative 4 0 - 6 %    Basophils Relative 1 0 - 1 %    Neutrophils Absolute 4 47 1 85 - 7 62 Thousands/µL    Immature Grans Absolute 0 04 0 00 - 0 20 Thousand/uL    Lymphocytes Absolute 3 75 0 60 - 4 47 Thousands/µL    Monocytes Absolute 0 85 0 17 - 1 22 Thousand/µL    Eosinophils Absolute 0 40 0 00 - 0 61 Thousand/µL    Basophils Absolute 0 10 0 00 - 0 10 Thousands/µL   Comprehensive metabolic panel    Collection Time: 06/12/18  6:08 AM   Result Value Ref Range    Sodium 137 136 - 145 mmol/L    Potassium 4 2 3 5 - 5 3 mmol/L    Chloride 105 100 - 108 mmol/L    CO2 26 21 - 32 mmol/L    Anion Gap 6 4 - 13 mmol/L    BUN 13 5 - 25 mg/dL    Creatinine 0 68 0 60 - 1 30 mg/dL    Glucose 100 65 - 140 mg/dL    Glucose, Fasting 100 (H) 65 - 99 mg/dL    Calcium 8 8 8 3 - 10 1 mg/dL    AST 14 5 - 45 U/L    ALT 18 12 - 78 U/L    Alkaline Phosphatase 84 46 - 116 U/L    Total Protein 6 9 6 4 - 8 2 g/dL    Albumin 3 2 (L) 3 5 - 5 0 g/dL    Total Bilirubin 0 51 0 20 - 1 00 mg/dL    eGFR 115 ml/min/1 73sq m   Lipid panel    Collection Time: 06/12/18  6:08 AM   Result Value Ref Range    Cholesterol 162 50 - 200 mg/dL    Triglycerides 122 <=150 mg/dL    HDL, Direct 44 40 - 60 mg/dL    LDL Calculated 94 0 - 100 mg/dL    Non-HDL-Chol (CHOL-HDL) 118 mg/dl   Magnesium    Collection Time: 06/12/18  6:08 AM   Result Value Ref Range    Magnesium 2 4 1 6 - 2 6 mg/dL   TSH, 3rd generation    Collection Time: 06/12/18  6:08 AM   Result Value Ref Range    TSH 3RD GENERATON 3 390 0 358 - 3 740 uIU/mL       Code Status: Level 1 - Full Code    Hair Anne PA-C

## 2018-06-12 NOTE — PLAN OF CARE
Problem: SELF HARM/SUICIDALITY  Goal: Will have no self-injury during hospital stay  INTERVENTIONS:  - Q 15 MINUTES: Routine safety checks  - Q WAKING SHIFT & PRN: Assess risk to determine if routine checks are adequate to maintain patient safety  - Encourage patient to participate actively in care by formulating a plan to combat response to suicidal ideation, identify supports and resources   Outcome: Progressing      Problem: DEPRESSION  Goal: Will be euthymic at discharge  INTERVENTIONS:  - Administer medication as ordered  - Provide emotional support via 1:1 interaction with staff  - Encourage involvement in milieu/groups/activities  - Monitor for social isolation   Outcome: Progressing      Problem: BEHAVIOR  Goal: Pt/Family maintain appropriate behavior and adhere to behavioral management agreement, if implemented  INTERVENTIONS:  - Assess the family dynamic   - Encourage verbalization of thoughts and concerns in a socially appropriate manner  - Assess patient/family's coping skills and non-compliant behavior (including use of illegal substances)  - Utilize positive, consistent limit setting strategies supporting safety of patient, staff and others  - Initiate consult with Case Management, Spiritual Care or other ancillary services as appropriate  - If a patient's/visitor's behavior jeopardizes the safety of the patient, staff, or others, refer to organization procedure     - Notify Security of behavior or suspected illegal substances which indicate the need for search of the patient and/or belongings  - Encourage participation in the decision making process about a behavioral management agreement; implement if patient meets criteria   Outcome: Progressing      Problem: ANXIETY  Goal: Will report anxiety at manageable levels  INTERVENTIONS:  - Administer medication as ordered  - Teach and encourage coping skills  - Provide emotional support  - Assess patient/family for anxiety and ability to cope   Outcome: Progressing    Goal: By discharge: Patient will verbalize 2 strategies to deal with anxiety  Interventions:  - Identify any obvious source/trigger to anxiety  - Staff will assist patient in applying identified coping technique/skills  - Encourage attendance of scheduled groups and activities   Outcome: Progressing      Problem: SLEEP DISTURBANCE  Goal: Will exhibit normal sleeping pattern  Interventions:  -  Assess the patients sleep pattern, noting recent changes  - Administer medication as ordered  - Decrease environmental stimuli, including noise, as appropriate during the night  - Encourage the patient to actively participate in unit groups and or exercise during the day to enhance ability to achieve adequate sleep at night  - Assess the patient, in the morning, encouraging a description of sleep experience   Outcome: Progressing      Problem: SELF CARE DEFICIT  Goal: Return ADL status to a safe level of function  INTERVENTIONS:  - Administer medication as ordered  - Assess ADL deficits and provide assistive devices as needed  - Obtain PT/OT consults as needed  - Assist and instruct patient to increase activity and self care as tolerated   Outcome: Progressing      Problem: SPIRITUAL CARE  Goal: Pt/Family able to move forward in process of forgiving self, others and/or higher power  INTERVENTIONS:  - Assist patient with any spiritual needs/requests such as communion, confession, anointing, etc  - Explore guilt and help patient/family identify possible spiritual/cultural beliefs and values  - Explore possibilities of making amends & reconciliation with self, others, and/or a greater power  - Guide patient/family in identifying painful feelings  - Help patient explore and identify spiritual beliefs, cultural understandings or values that may help or hinder letting go of issue  - Help patient explore feelings of anger, bitterness, resentment, anxiety   Help patient/family identify and examine the situation in which these feelings are experienced  - Help patient/family identify destructive displacement of feelings onto other individuals  - Refer patient to formal counseling and/or to [facility] for further support as needed or per request   Outcome: Progressing    Goal: Patient feels balance and connection with others and/or higher power that empowers the self during times of loss, guilt and fear  INTERVENTIONS:  - Create safety for patient through empathic presence and non-judgmental listening  - Encourage patient to explore his/her values, beliefs and/or spiritual images and practices  - Encourage use of breath work, imagery, meditation, relaxation, reiki to ease distress and provide healing  - Encourage use of cultural and spiritual celebrations and rituals  - Facilitate discussion that helps patient sort out spiritual concerns  - Help patient identify where meaning/hope/comfort & strength are in his/her life  - Refer patient to [facility] for assistance, as appropriate  - Respond to patient/family need for prayer/ritual/sacrament/ceremony   Outcome: Progressing      Problem: DISCHARGE PLANNING  Goal: Discharge to home or other facility with appropriate resources  INTERVENTIONS:  - Identify barriers to discharge w/patient and caregiver  - Arrange for needed discharge resources and transportation as appropriate  - Identify discharge learning needs (meds, wound care, etc )  - Arrange for interpretive services to assist at discharge as needed  - Refer to Case Management Department for coordinating discharge planning if the patient needs post-hospital services based on physician/advanced practitioner order or complex needs related to functional status, cognitive ability, or social support system   Outcome: Progressing      Problem: Ineffective Coping  Goal: Cooperates with admission process  Interventions:   - Complete admission process   Outcome: Completed Date Met: 06/12/18    Goal: Participates in unit activities  Interventions:  - Provide therapeutic environment   - Provide required programming   - Redirect inappropriate behaviors    Outcome: Progressing

## 2018-06-12 NOTE — PROGRESS NOTES
Pt denies all symptoms  He denies anxiety, depression or any suicidal thoughts  Pt states he was never suicidal and instead was "writing out his feelings" because that helps him process  Pt has been calm, pleasant and social  Will monitor

## 2018-06-12 NOTE — ED NOTES
Insurance Authorization:   Phone call placed to [address] [place] [address]  Phone number:[phone]  Spoke to [facility]     4 days approved  Level of care: IP   Review on 6/14/18     Authorization # [id number]    Review call- [phone] option 3

## 2018-06-12 NOTE — PLAN OF CARE
ANXIETY     Will report anxiety at manageable levels Not Progressing     By discharge: Patient will verbalize 2 strategies to deal with anxiety Not Progressing        BEHAVIOR     Pt/Family maintain appropriate behavior and adhere to behavioral management agreement, if implemented Not Progressing        DEPRESSION     Will be euthymic at discharge Not Progressing        SELF CARE DEFICIT     Return ADL status to a safe level of function Not Progressing        SELF HARM/SUICIDALITY     Will have no self-injury during hospital stay Not Progressing        Johana Jackson Will exhibit normal sleeping pattern Not Progressing        SPIRITUAL CARE     Pt/Family able to move forward in process of forgiving self, others and/or higher power Not Progressing     Patient feels balance and connection with others and/or higher power that empowers the self during times of loss, guilt and fear Not Progressing

## 2018-06-12 NOTE — CASE MANAGEMENT
CM called patients daughter Wendi Sanchez at [phone] and the phone message said "the wireless customer is not available" and CM unable to leave a message

## 2018-06-13 PROCEDURE — 99232 SBSQ HOSP IP/OBS MODERATE 35: CPT | Performed by: NURSE PRACTITIONER

## 2018-06-13 RX ADMIN — NICOTINE POLACRILEX 2 MG: 2 GUM, CHEWING ORAL at 18:21

## 2018-06-13 RX ADMIN — NICOTINE POLACRILEX 2 MG: 2 GUM, CHEWING ORAL at 08:14

## 2018-06-13 RX ADMIN — NICOTINE POLACRILEX 2 MG: 2 GUM, CHEWING ORAL at 22:32

## 2018-06-13 RX ADMIN — NICOTINE POLACRILEX 2 MG: 2 GUM, CHEWING ORAL at 14:25

## 2018-06-13 RX ADMIN — NICOTINE 21 MG: 21 PATCH, EXTENDED RELEASE TRANSDERMAL at 08:12

## 2018-06-13 RX ADMIN — NICOTINE POLACRILEX 2 MG: 2 GUM, CHEWING ORAL at 16:22

## 2018-06-13 NOTE — PROGRESS NOTES
Patient out on unit watching tv  Patient denies all signs and symptoms  Patient is pleasant and cooperative

## 2018-06-13 NOTE — PLAN OF CARE
Problem: SELF HARM/SUICIDALITY  Goal: Will have no self-injury during hospital stay  INTERVENTIONS:  - Q 15 MINUTES: Routine safety checks  - Q WAKING SHIFT & PRN: Assess risk to determine if routine checks are adequate to maintain patient safety  - Encourage patient to participate actively in care by formulating a plan to combat response to suicidal ideation, identify supports and resources   Outcome: Progressing      Problem: DEPRESSION  Goal: Will be euthymic at discharge  INTERVENTIONS:  - Administer medication as ordered  - Provide emotional support via 1:1 interaction with staff  - Encourage involvement in milieu/groups/activities  - Monitor for social isolation   Outcome: Progressing      Problem: BEHAVIOR  Goal: Pt/Family maintain appropriate behavior and adhere to behavioral management agreement, if implemented  INTERVENTIONS:  - Assess the family dynamic   - Encourage verbalization of thoughts and concerns in a socially appropriate manner  - Assess patient/family's coping skills and non-compliant behavior (including use of illegal substances)  - Utilize positive, consistent limit setting strategies supporting safety of patient, staff and others  - Initiate consult with Case Management, Spiritual Care or other ancillary services as appropriate  - If a patient's/visitor's behavior jeopardizes the safety of the patient, staff, or others, refer to organization procedure     - Notify Security of behavior or suspected illegal substances which indicate the need for search of the patient and/or belongings  - Encourage participation in the decision making process about a behavioral management agreement; implement if patient meets criteria   Outcome: Completed Date Met: 06/13/18      Problem: ANXIETY  Goal: Will report anxiety at manageable levels  INTERVENTIONS:  - Administer medication as ordered  - Teach and encourage coping skills  - Provide emotional support  - Assess patient/family for anxiety and ability to cope   Outcome: Progressing  Denies any anxiety   Goal: By discharge: Patient will verbalize 2 strategies to deal with anxiety  Interventions:  - Identify any obvious source/trigger to anxiety  - Staff will assist patient in applying identified coping technique/skills  - Encourage attendance of scheduled groups and activities   Outcome: Progressing      Problem: SLEEP DISTURBANCE  Goal: Will exhibit normal sleeping pattern  Interventions:  -  Assess the patients sleep pattern, noting recent changes  - Administer medication as ordered  - Decrease environmental stimuli, including noise, as appropriate during the night  - Encourage the patient to actively participate in unit groups and or exercise during the day to enhance ability to achieve adequate sleep at night  - Assess the patient, in the morning, encouraging a description of sleep experience   Outcome: Progressing  Denies any problems with sleep     Problem: SELF CARE DEFICIT  Goal: Return ADL status to a safe level of function  INTERVENTIONS:  - Administer medication as ordered  - Assess ADL deficits and provide assistive devices as needed  - Obtain PT/OT consults as needed  - Assist and instruct patient to increase activity and self care as tolerated   Outcome: Completed Date Met: 06/13/18      Problem: SPIRITUAL CARE  Goal: Pt/Family able to move forward in process of forgiving self, others and/or higher power  INTERVENTIONS:  - Assist patient with any spiritual needs/requests such as communion, confession, anointing, etc  - Explore guilt and help patient/family identify possible spiritual/cultural beliefs and values  - Explore possibilities of making amends & reconciliation with self, others, and/or a greater power  - Guide patient/family in identifying painful feelings  - Help patient explore and identify spiritual beliefs, cultural understandings or values that may help or hinder letting go of issue  - Help patient explore feelings of anger, bitterness, resentment, anxiety   Help patient/family identify and examine the situation in which these feelings are experienced  - Help patient/family identify destructive displacement of feelings onto other individuals  - Refer patient to formal counseling and/or to [facility] for further support as needed or per request   Outcome: Completed Date Met: 06/13/18    Goal: Patient feels balance and connection with others and/or higher power that empowers the self during times of loss, guilt and fear  INTERVENTIONS:  - Create safety for patient through empathic presence and non-judgmental listening  - Encourage patient to explore his/her values, beliefs and/or spiritual images and practices  - Encourage use of breath work, imagery, meditation, relaxation, reiki to ease distress and provide healing  - Encourage use of cultural and spiritual celebrations and rituals  - Facilitate discussion that helps patient sort out spiritual concerns  - Help patient identify where meaning/hope/comfort & strength are in his/her life  - Refer patient to [facility] for assistance, as appropriate  - Respond to patient/family need for prayer/ritual/sacrament/ceremony   Outcome: Completed Date Met: 06/13/18      Problem: DISCHARGE PLANNING  Goal: Discharge to home or other facility with appropriate resources  INTERVENTIONS:  - Identify barriers to discharge w/patient and caregiver  - Arrange for needed discharge resources and transportation as appropriate  - Identify discharge learning needs (meds, wound care, etc )  - Arrange for interpretive services to assist at discharge as needed  - Refer to Case Management Department for coordinating discharge planning if the patient needs post-hospital services based on physician/advanced practitioner order or complex needs related to functional status, cognitive ability, or social support system   Outcome: Progressing      Problem: Ineffective Coping  Goal: Participates in unit activities  Interventions:  - Provide therapeutic environment   - Provide required programming   - Redirect inappropriate behaviors    Outcome: Progressing

## 2018-06-13 NOTE — PROGRESS NOTES
Pt denies all symptoms  Calm and social on unit  C/o headache from nicotine withdrawal and given PRN nicotine gum and Motrin  Will continue to monitor

## 2018-06-13 NOTE — PROGRESS NOTES
Progress Note - Behavioral Health     Liliane Pena 56 y o  male MRN: [id number]   Unit/Bed#: DJ8 017-76 Encounter: [id number]    Behavior over the last 24 hours: unchanged  Eugene Alejandro remains calm and cooperative, he denies SI/HI, auditory or visual hallucination today  Eugene Alejandro remains "sad" when he thinks about his wife but states he just "would like to know what is happening with the relationship "  Eugene Alejandro is open to counseling but is not willing to discuss medications, he is concerned about his history with addiction and does not like to take medications  He is taking time on the unit to journal as he states this helps him to work through his feelings  Follows directions appropriately  Participates appropriately in milieu  Participates appropriately in group therapy  Attends groups  Attends group therapy  Socializes with peers  Social in milieu       Sleep: slept better  Appetite: normal  Medication side effects: not taking medications   ROS: no complaints, denies any headache, shortness of breath, chest pain, abdominal pain, constipation or vomiting    Mental Status Evaluation:    Appearance:  disheveled, wearing hospital clothes   Behavior:  calm, improved eye contact, remains slightly evasive   Speech:  normal rate, normal volume, normal pitch   Mood:  euthymic   Affect:  mood-congruent   Thought Process:  goal directed   Associations: intact associations   Thought Content:  no overt delusions   Perceptual Disturbances: no auditory hallucinations, no visual hallucinations   Risk Potential: Suicidal ideation - None  Homicidal ideation - None  Potential for aggression - No   Sensorium:  oriented to person, place and time/date   Memory:  recent and remote memory grossly intact   Consciousness:  alert and awake   Attention: attention span and concentration are age appropriate   Insight:  limited   Judgment: limited   Gait/Station: normal gait/station and normal balance   Motor Activity: no abnormal movements     Vital signs in last 24 hours:    Temp:  [98 2 °F (36 8 °C)-98 3 °F (36 8 °C)] 98 3 °F (36 8 °C)  HR:  [68-85] 85  Resp:  [16] 16  BP: (112-113)/(67-71) 113/67    Laboratory results:    I have personally reviewed all pertinent laboratory/tests results  Most Recent Labs:   Lab Results   Component Value Date    WBC 9 61 06/12/2018    RBC 5 64 (H) 06/12/2018    HGB 16 5 06/12/2018    HCT 50 1 (H) 06/12/2018     06/12/2018    RDW 12 9 06/12/2018    NEUTROABS 4 47 06/12/2018     06/12/2018    K 4 2 06/12/2018     06/12/2018    CO2 26 06/12/2018    BUN 13 06/12/2018    CREATININE 0 68 06/12/2018    GLUCOSE 100 06/12/2018    CALCIUM 8 8 06/12/2018    AST 14 06/12/2018    ALT 18 06/12/2018    ALKPHOS 84 06/12/2018    PROT 6 9 06/12/2018    BILITOT 0 51 06/12/2018    CHOL 162 06/12/2018    HDL 44 06/12/2018    TRIG 122 06/12/2018    LDLCALC 94 06/12/2018    [id number] 3 390 06/12/2018    RPR Non-Reactive 06/12/2018       Progress Toward Goals: slight progress , patient may be willing to attend [employer] program, concerned about income, will discuss with   Assessment/Plan   Principal Problem:    MDD (major depressive disorder), single episode, severe , no psychosis ([facility] [address] )    Recommended Treatment:     Planned medication and treatment changes: All current active medications have been reviewed    Encourage group therapy, milieu therapy and occupational therapy  Behavioral Health checks every 5 minutes      Current Facility-Administered Medications:  acetaminophen 650 mg Oral Q6H PRN Paras Arredondo MD   aluminum-magnesium hydroxide-simethicone 20 mL Oral Q4H PRN Paras Bold, MD   benztropine 1 mg Intramuscular Q6H PRN Paras Arredondo, MD   benztropine 1 mg Oral Q6H PRN Paras Bold, MD   haloperidol 5 mg Oral Q8H PRN Paras Bold, MD   haloperidol lactate 5 mg Intramuscular Q6H PRN Paras MD Arnulfo   hydrOXYzine HCL 25 mg Oral Q6H PRN Paras Bold, MD   ibuprofen 600 mg Oral Q8H PRN Isis MedicMD simón   ibuprofen 800 mg Oral Q8H PRN Isis Flores, MD   LORazepam 1 mg Intramuscular Q6H PRN Isis Flores, MD   LORazepam 1 mg Oral Q8H PRN Isis Medic, MD   magnesium hydroxide 30 mL Oral Daily PRN Isis Flores, MD   nicotine 21 mg Transdermal Daily HYACINTH Omalley   nicotine polacrilex 2 mg Oral Q2H PRN Isis Medic, MD   OLANZapine 10 mg Intramuscular Q3H PRN Isis Medic, MD   OLANZapine 10 mg Oral Q3H PRN Isis Medic, MD   risperiDONE 1 mg Oral Q3H PRN Isis Medic, MD   traZODone 50 mg Oral HS PRN Isis MedicMD simón       Risks / Benefits of Treatment:    Risks, benefits, and possible side effects of medications explained to patient and patient verbalizes understanding  At this time patient does not want to start medications  Counseling / Coordination of Care:    Patient's progress discussed with staff in treatment team meeting  Medications, treatment progress and treatment plan reviewed with patient  Supportive counseling provided to the patient

## 2018-06-14 PROCEDURE — 99232 SBSQ HOSP IP/OBS MODERATE 35: CPT | Performed by: NURSE PRACTITIONER

## 2018-06-14 RX ADMIN — ACETAMINOPHEN 650 MG: 325 TABLET ORAL at 16:35

## 2018-06-14 RX ADMIN — NICOTINE POLACRILEX 2 MG: 2 GUM, CHEWING ORAL at 14:59

## 2018-06-14 RX ADMIN — NICOTINE POLACRILEX 2 MG: 2 GUM, CHEWING ORAL at 08:11

## 2018-06-14 RX ADMIN — NICOTINE POLACRILEX 2 MG: 2 GUM, CHEWING ORAL at 12:13

## 2018-06-14 RX ADMIN — NICOTINE POLACRILEX 2 MG: 2 GUM, CHEWING ORAL at 22:59

## 2018-06-14 RX ADMIN — NICOTINE 21 MG: 21 PATCH, EXTENDED RELEASE TRANSDERMAL at 08:09

## 2018-06-14 RX ADMIN — NICOTINE POLACRILEX 2 MG: 2 GUM, CHEWING ORAL at 16:34

## 2018-06-14 NOTE — CASE MANAGEMENT
Spoke with pt's daughter, Lucina Durbin, who states that the pt can return home  She states there are absolutely no guns in their home and that no guns will ever be allowed in their home  Lucina Durbin thinks pt bought the gun weeks ago to possibly hurt himself with it but cannot be sure, however, she is sure that there is no gun in their home  Lucina Durbin states they live together with her children and her  Pili Castillo who is a   Lucina Durbin states that the pt cannot manipulate herself and Pili Ma, that they know him too well  Lucina Durbin states that the pt has been in this dysfunctional relationship for a long time and other dysfunctional relationships, has a long history of stating he is going to hurt himself over the years when the relationships would have problems  Lucina Durbin states there will be ground-rules at home with pt and limits will be set in that no gun or weapon will ever be allowed in their home and pt will have to go to his outpt appts and that if he starts to feel depressed, that he will ask his outpt psychiatrist to use medications as pt has refused medications on this unit  Florencia Castellanos request for [facility] transport Cheryl Garcia was faxed this early afternoon for tomorrow's discharge of pt

## 2018-06-14 NOTE — PROGRESS NOTES
Progress Note - Behavioral Health     Bobby Mullen 56 y o  male MRN: [id number]   Unit/Bed#: JO9 949-44 Encounter: [id number]    Behavior over the last 24 hours: improving  Navarro Mtz denies auditory, visual hallucinations, he denies suicidal and homicidal ideation today  Navarro Mtz was willing to have case management speak with his daughter to confirm that there are no guns in the home to plan for discharge  He is invested in discharge planning and working to set up his work schedule, the  Home	[place] and his counseling  He states he is sleeping well and his appetite is improved, he has not been feeling depressed, his thoughts are not racing or focused on his ex girlfriend  Attends group therapy  Attends some groups  Social in milieu  Social with peers      Sleep: normal  Appetite: normal  Medication side effects: Not taking medications   ROS: no complaints    Mental Status Evaluation:    Appearance:  wearing hospital clothes   Behavior:  cooperative, calm   Speech:  normal rate, normal volume, normal pitch   Mood:  euthymic   Affect:  normal range and intensity   Thought Process:  organized, logical, goal directed   Associations: intact associations   Thought Content:  no overt delusions   Perceptual Disturbances: no auditory hallucinations, no visual hallucinations   Risk Potential: Suicidal ideation - None  Homicidal ideation - None  Potential for aggression - No   Sensorium:  oriented to person, place and time/date   Memory:  recent and remote memory grossly intact   Consciousness:  alert and awake   Attention: attention span and concentration are age appropriate   Insight:  improving   Judgment: improving   Gait/Station: normal gait/station and normal balance   Motor Activity: no abnormal movements     Vital signs in last 24 hours:    Temp:  [97 3 °F (36 3 °C)-97 5 °F (36 4 °C)] 97 5 °F (36 4 °C)  HR:  [58-65] 58  Resp:  [16-18] 16  BP: (100-133)/(74-78) 133/78    Laboratory results:    I have personally reviewed all pertinent laboratory/tests results  Most Recent Labs:   Lab Results   Component Value Date    WBC 9 61 06/12/2018    RBC 5 64 (H) 06/12/2018    HGB 16 5 06/12/2018    HCT 50 1 (H) 06/12/2018     06/12/2018    RDW 12 9 06/12/2018    NEUTROABS 4 47 06/12/2018     06/12/2018    K 4 2 06/12/2018     06/12/2018    CO2 26 06/12/2018    BUN 13 06/12/2018    CREATININE 0 68 06/12/2018    GLUCOSE 100 06/12/2018    CALCIUM 8 8 06/12/2018    AST 14 06/12/2018    ALT 18 06/12/2018    ALKPHOS 84 06/12/2018    PROT 6 9 06/12/2018    BILITOT 0 51 06/12/2018    CHOL 162 06/12/2018    HDL 44 06/12/2018    TRIG 122 06/12/2018    LDLCALC 94 06/12/2018    [id number] 3 390 06/12/2018    RPR Non-Reactive 06/12/2018       Progress Toward Goals: improving progressively    Assessment/Plan   Principal Problem:    MDD (major depressive disorder), single episode, severe , no psychosis ([facility] [address] )    Recommended Treatment:     Planned medication and treatment changes: All current active medications have been reviewed    Encourage group therapy, milieu therapy and occupational therapy  [address] checks every 5 minutes  Plan for discharge to home with Christy Ochoa to daughter's house in AM    Current Facility-Administered Medications:  acetaminophen 650 mg Oral Q6H PRN Padmini Frost MD   aluminum-magnesium hydroxide-simethicone 20 mL Oral Q4H PRN Padmini Frost MD   benztropine 1 mg Intramuscular Q6H PRN Padmini Frost MD   benztropine 1 mg Oral Q6H PRN Padmini Frost MD   haloperidol 5 mg Oral Q8H PRGRAHAM Frost MD   haloperidol lactate 5 mg Intramuscular Q6H PRGRAHAM Frost MD   hydrOXYzine HCL 25 mg Oral Q6H PRN Padmini Frost MD   ibuprofen 600 mg Oral Q8H PRN Padmini Frost MD   ibuprofen 800 mg Oral Q8H PRN Padmini Frost MD   LORazepam 1 mg Intramuscular Q6H PRN Padmini Frost MD   LORazepam 1 mg Oral Q8H PRN Padmini Frost MD   magnesium hydroxide 30 mL Oral Daily PRN Marvin Billy MD   nicotine 21 mg Transdermal Daily HYACINTH Echols   nicotine polacrilex 2 mg Oral Q2H PRN Marvin Billy MD   OLANZapine 10 mg Intramuscular Q3H PRN Marvin Billy MD   OLANZapine 10 mg Oral Q3H PRN Marvin Billy MD   risperiDONE 1 mg Oral Q3H PRN Marvin Billy MD   traZODone 50 mg Oral HS PRN Marvin Billy MD       Counseling / Coordination of Care:    Patient's progress discussed with staff in treatment team meeting  Medications, treatment progress and treatment plan reviewed with patient  Coping skills reviewed with patient  Discussed with patient acceptance of mental illness diagnosis and need for ongoing treatment after discharge  Supportive counseling provided to the patient

## 2018-06-14 NOTE — PLAN OF CARE
Problem: SELF HARM/SUICIDALITY  Goal: Will have no self-injury during hospital stay  INTERVENTIONS:  - Q 15 MINUTES: Routine safety checks  - Q WAKING SHIFT & PRN: Assess risk to determine if routine checks are adequate to maintain patient safety  - Encourage patient to participate actively in care by formulating a plan to combat response to suicidal ideation, identify supports and resources   Outcome: Progressing      Problem: DEPRESSION  Goal: Will be euthymic at discharge  INTERVENTIONS:  - Administer medication as ordered  - Provide emotional support via 1:1 interaction with staff  - Encourage involvement in milieu/groups/activities  - Monitor for social isolation   Outcome: Progressing      Problem: ANXIETY  Goal: Will report anxiety at manageable levels  INTERVENTIONS:  - Administer medication as ordered  - Teach and encourage coping skills  - Provide emotional support  - Assess patient/family for anxiety and ability to cope   Outcome: Progressing    Goal: By discharge: Patient will verbalize 2 strategies to deal with anxiety  Interventions:  - Identify any obvious source/trigger to anxiety  - Staff will assist patient in applying identified coping technique/skills  - Encourage attendance of scheduled groups and activities   Outcome: Progressing      Problem: SLEEP DISTURBANCE  Goal: Will exhibit normal sleeping pattern  Interventions:  -  Assess the patients sleep pattern, noting recent changes  - Administer medication as ordered  - Decrease environmental stimuli, including noise, as appropriate during the night  - Encourage the patient to actively participate in unit groups and or exercise during the day to enhance ability to achieve adequate sleep at night  - Assess the patient, in the morning, encouraging a description of sleep experience   Outcome: Progressing      Problem: DISCHARGE PLANNING  Goal: Discharge to home or other facility with appropriate resources  INTERVENTIONS:  - Identify barriers to discharge w/patient and caregiver  - Arrange for needed discharge resources and transportation as appropriate  - Identify discharge learning needs (meds, wound care, etc )  - Arrange for interpretive services to assist at discharge as needed  - Refer to Case Management Department for coordinating discharge planning if the patient needs post-hospital services based on physician/advanced practitioner order or complex needs related to functional status, cognitive ability, or social support system   Outcome: Progressing      Problem: Ineffective Coping  Goal: Participates in unit activities  Interventions:  - Provide therapeutic environment   - Provide required programming   - Redirect inappropriate behaviors    Outcome: Progressing

## 2018-06-14 NOTE — PROGRESS NOTES
Pt denies all symptoms  He has been calm and cooperative  He is pleasant  Pt is hopeful for d/c tomorrow

## 2018-06-15 ENCOUNTER — TELEPHONE (OUTPATIENT)
Dept: PSYCHIATRY | Facility: CLINIC | Age: 48
End: 2018-06-15

## 2018-06-15 VITALS
WEIGHT: 168.8 LBS | HEART RATE: 50 BPM | DIASTOLIC BLOOD PRESSURE: 62 MMHG | SYSTOLIC BLOOD PRESSURE: 107 MMHG | HEIGHT: 71 IN | BODY MASS INDEX: 23.63 KG/M2 | RESPIRATION RATE: 16 BRPM | TEMPERATURE: 97.6 F

## 2018-06-15 PROCEDURE — 99239 HOSP IP/OBS DSCHRG MGMT >30: CPT | Performed by: NURSE PRACTITIONER

## 2018-06-15 RX ORDER — HYDROXYZINE HYDROCHLORIDE 25 MG/1
25 TABLET, FILM COATED ORAL EVERY 6 HOURS PRN
Qty: 120 TABLET | Refills: 0 | Status: SHIPPED | OUTPATIENT
Start: 2018-06-15 | End: 2018-07-15

## 2018-06-15 RX ORDER — NICOTINE 21 MG/24HR
1 PATCH, TRANSDERMAL 24 HOURS TRANSDERMAL DAILY
Qty: 28 PATCH | Refills: 0 | Status: SHIPPED | OUTPATIENT
Start: 2018-06-16 | End: 2018-07-14

## 2018-06-15 RX ADMIN — NICOTINE POLACRILEX 2 MG: 2 GUM, CHEWING ORAL at 11:21

## 2018-06-15 RX ADMIN — NICOTINE 21 MG: 21 PATCH, EXTENDED RELEASE TRANSDERMAL at 08:04

## 2018-06-15 RX ADMIN — NICOTINE POLACRILEX 2 MG: 2 GUM, CHEWING ORAL at 07:30
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
## 2018-06-15 NOTE — PROGRESS NOTES
Pt denies all symptoms  He has been calm, cooperative and appropriate  He is looking forward to his d/c today

## 2018-06-15 NOTE — DISCHARGE INSTR - LAB
Contact Information: If you have any questions, concerns, pended studies, tests and/or procedures, or emergencies regarding your inpatient behavioral health visit  Please contact [facility] behavioral health unit [phone] and ask to speak to a , nurse or physician  A contact is available 24 hours/ 7 days a week at this number  Summary of Procedures Performed During your Stay:  Below is a list of major procedures performed during your hospital stay and a summary of results:  - No major procedures performed  Pending Studies     None        If studies are pending at discharge, follow up with your PCP and/or referring provider

## 2018-06-15 NOTE — CASE MANAGEMENT
Pt had a bright affect today, was very happy to be going home and looking forward to discharge  Pt states he is motivated for [facility] and private therapy as well  Pt denies any ideations whatsoever for self harm and is looking forward to returning to his daughter's home and also getting back to work  Pt states he has gained insight and is feeling much better overall due to his stay on this unit  Referral process to [facility] was completed, the Jess Seal will pick pt up to take him to [facility] and pt is aware to be ready at 7am for the Jess Seal pick-up  Verified with pt's insurance that he will be covered to go to [facility] and no pre-auth was required as per [address] [place] [address] B from [employer] - this was verified twice  Pt took the bus home, he wanted to take the bus and is familiar with the bus system  Pt states he was leaving with all of his belongings  Daughter Leigh Davis notified of the pt's discharge and his appts and she is in support of his discharge plan  Pt has a note for work and states he will go to work tomorrow

## 2018-06-15 NOTE — PLAN OF CARE
Problem: SELF HARM/SUICIDALITY  Goal: Will have no self-injury during hospital stay  INTERVENTIONS:  - Q 15 MINUTES: Routine safety checks  - Q WAKING SHIFT & PRN: Assess risk to determine if routine checks are adequate to maintain patient safety  - Encourage patient to participate actively in care by formulating a plan to combat response to suicidal ideation, identify supports and resources   Outcome: Completed Date Met: 06/15/18      Problem: DEPRESSION  Goal: Will be euthymic at discharge  INTERVENTIONS:  - Administer medication as ordered  - Provide emotional support via 1:1 interaction with staff  - Encourage involvement in milieu/groups/activities  - Monitor for social isolation   Outcome: Completed Date Met: 06/15/18      Problem: ANXIETY  Goal: Will report anxiety at manageable levels  INTERVENTIONS:  - Administer medication as ordered  - Teach and encourage coping skills  - Provide emotional support  - Assess patient/family for anxiety and ability to cope   Outcome: Completed Date Met: 06/15/18    Goal: By discharge: Patient will verbalize 2 strategies to deal with anxiety  Interventions:  - Identify any obvious source/trigger to anxiety  - Staff will assist patient in applying identified coping technique/skills  - Encourage attendance of scheduled groups and activities   Outcome: Completed Date Met: 06/15/18      Problem: SLEEP DISTURBANCE  Goal: Will exhibit normal sleeping pattern  Interventions:  -  Assess the patients sleep pattern, noting recent changes  - Administer medication as ordered  - Decrease environmental stimuli, including noise, as appropriate during the night  - Encourage the patient to actively participate in unit groups and or exercise during the day to enhance ability to achieve adequate sleep at night  - Assess the patient, in the morning, encouraging a description of sleep experience   Outcome: Completed Date Met: 06/15/18      Problem: DISCHARGE PLANNING  Goal: Discharge to home or other facility with appropriate resources  INTERVENTIONS:  - Identify barriers to discharge w/patient and caregiver  - Arrange for needed discharge resources and transportation as appropriate  - Identify discharge learning needs (meds, wound care, etc )  - Arrange for interpretive services to assist at discharge as needed  - Refer to Case Management Department for coordinating discharge planning if the patient needs post-hospital services based on physician/advanced practitioner order or complex needs related to functional status, cognitive ability, or social support system   Outcome: Completed Date Met: 06/15/18      Problem: Ineffective Coping  Goal: Participates in unit activities  Interventions:  - Provide therapeutic environment   - Provide required programming   - Redirect inappropriate behaviors    Outcome: Completed Date Met: 06/15/18

## 2018-06-15 NOTE — TELEPHONE ENCOUNTER
[facility] Intake Assessment     Presenting Stressors: Patient admitted 06-11-18 on 201 for recent Depression and Anxiety suicidal threats sent text to ex girlfriend that he may do something stupid  Referral Source: Dr Missy Espino     he is employed at [employer] to weapons? No  Is he a smoker? yes    Symptoms: suicidal ideation and plan bought gun to shoot self anxiety depression       No current facility-administered medications for this visit       Current Outpatient Prescriptions:     hydrOXYzine HCL (ATARAX) 25 mg tablet, Take 1 tablet (25 mg total) by mouth every 6 (six) hours as needed for anxiety for up to 30 days, Disp: 120 tablet, Rfl: 0    [START ON 6/16/2018] nicotine (NICODERM CQ) 21 mg/24 hr TD 24 hr patch, Place 1 patch on the skin daily for 28 days, Disp: 28 patch, Rfl: 0    Facility-Administered Medications Ordered in Other Visits:     acetaminophen (TYLENOL) tablet 650 mg, 650 mg, Oral, Q6H PRN, Christy Ram MD, 650 mg at 06/14/18 1635    aluminum-magnesium hydroxide-simethicone (MYLANTA) 200-200-20 mg/5 mL oral suspension 20 mL, 20 mL, Oral, Q4H PRN, Christy Ram MD    benztropine (COGENTIN) injection 1 mg, 1 mg, Intramuscular, Q6H PRN, Christy Ram MD    benztropine (COGENTIN) tablet 1 mg, 1 mg, Oral, Q6H PRN, Christy Ram MD    haloperidol (HALDOL) tablet 5 mg, 5 mg, Oral, Q8H PRN, Christy Ram MD    haloperidol lactate (HALDOL) injection 5 mg, 5 mg, Intramuscular, Q6H PRN, Christy Ram MD    hydrOXYzine HCL (ATARAX) tablet 25 mg, 25 mg, Oral, Q6H PRN, Christy Ram MD    ibuprofen (MOTRIN) tablet 600 mg, 600 mg, Oral, Q8H PRN, Christy Ram MD, 600 mg at 06/12/18 0807    ibuprofen (MOTRIN) tablet 800 mg, 800 mg, Oral, Q8H PRN, Christy Ram MD, 800 mg at 06/12/18 1944    LORazepam (ATIVAN) 2 mg/mL injection 1 mg, 1 mg, Intramuscular, Q6H PRN, Christy Ram MD    LORazepam (ATIVAN) tablet 1 mg, 1 mg, Oral, Q8H PRN, Peewee Fisher Andres Melgoza MD    magnesium hydroxide (MILK OF MAGNESIA) 400 mg/5 mL oral suspension 30 mL, 30 mL, Oral, Daily PRN, Marvin Billy MD  Leticia Dalal  nicotine (NICODERM CQ) 21 mg/24 hr TD 24 hr patch 21 mg, 21 mg, Transdermal, Daily, Abhishek Locks, CRNP, 21 mg at 06/15/18 0804    nicotine polacrilex (NICORETTE) gum 2 mg, 2 mg, Oral, Q2H PRN, Marvin Billy MD, 2 mg at 06/15/18 0730    OLANZapine (ZyPREXA) IM injection 10 mg, 10 mg, Intramuscular, Q3H PRN, Marvin Billy MD    OLANZapine (ZyPREXA) tablet 10 mg, 10 mg, Oral, Q3H PRN, Marvin Billy MD    risperiDONE (RisperDAL M-TABS) dispersible tablet 1 mg, 1 mg, Oral, Q3H PRN, Marvin Billy MD    traZODone (DESYREL) tablet 50 mg, 50 mg, Oral, HS PRN, Marvin Billy MD    Medications: See Above    No Known Allergies    Allergies: NKA    Provisional Diagnosis:   Axis I:MDD single Episode Severe w o Psychosis   Axis II: None    Substance Abuse:No concerns of substance abuse are reported      Psychiatric Treatment History:     Current psychiatrist: None  Therapist: None  Community Agency Supports: No  The patient requires ambulatory assistance: No    Legal Issues: No Legal Issues    Action: record recieved and laboratory work received    ACCEPTED Appointment Date: June 20th, 2018    DENIED Reason: None

## 2018-06-20 ENCOUNTER — OFFICE VISIT (OUTPATIENT)
Dept: PSYCHIATRY | Facility: CLINIC | Age: 48
End: 2018-06-20
Payer: COMMERCIAL

## 2018-06-20 ENCOUNTER — OFFICE VISIT (OUTPATIENT)
Dept: PSYCHOLOGY | Facility: CLINIC | Age: 48
End: 2018-06-20
Payer: COMMERCIAL

## 2018-06-20 DIAGNOSIS — F32.2 SEVERE MAJOR DEPRESSION WITHOUT PSYCHOTIC FEATURES (HCC): Primary | ICD-10-CM

## 2018-06-20 DIAGNOSIS — F32.2 MDD (MAJOR DEPRESSIVE DISORDER), SINGLE EPISODE, SEVERE , NO PSYCHOSIS (HCC): Primary | ICD-10-CM

## 2018-06-20 PROCEDURE — 90791 PSYCH DIAGNOSTIC EVALUATION: CPT

## 2018-06-20 PROCEDURE — 99215 OFFICE O/P EST HI 40 MIN: CPT | Performed by: PSYCHIATRY & NEUROLOGY

## 2018-06-20 PROCEDURE — G0177 OPPS/PHP; TRAIN & EDUC SERV: HCPCS

## 2018-06-20 PROCEDURE — G0410 GRP PSYCH PARTIAL HOSP 45-50: HCPCS

## 2018-06-20 NOTE — PSYCH
Subjective:     Patient ID: Hany Hermosillo is a 56 y o  male  [facility] Clinical Progress Notes      Specialized Services Documentation  Therapist must complete separate progress note for each specific clinical activity in which the individual participated during the day  Allied Therapy   [id number] Hany Hermosillo excused from [facility] group due to case management evaluation       Tx Plan Objective: na, Therapist:  Bridgette DEE    Education Therapy   Time:  9966-9559  Previous goal met: first treatment day  Readiness to Learning: Other ambivilent related to program  Barriers to Learning: None  Learning Assessment  Time: 5045-3651  Education Completed: Illness, Medication and Wellness Tools  Teaching Method: Verbal and Demonstration  Shared Area of Learning: Yes   Goal Set: write a letter to his wife  Tx Plan Objective: 1 1, Therapist:  Bridgette DEE

## 2018-06-20 NOTE — PSYCH
Subjective:     Patient ID: Luis Baird is a 56 y o  male  [facility] Clinical Progress Notes      Specialized Services Documentation  Therapist must complete separate progress note for each specific clinical activity in which the individual participated during the day  Group Psychotherapy   ([id number]) Silvia Nation participated in psychotherapy group focused on coping ahead to manage anxiety, as well as relationship difficulties  Silvia Nation identified his marriage as a stressor  He moderately engaged in group discussion, sharing some suggestions with a peer about how to handle an upcoming job interview  Group discussed ways to manage anxiety, such as coping ahead to feel more prepared and less anxious  Group also discussed strain in relationships, and ways to communicate with loved ones in more productive, healthy ways  Silvia Nation talked about the difficulty he is having with his wife and that he is upset because she has cut off contact with him  He encouraged another peer to be appreciative of his relationship with his wife  Some initial effort noted toward goals  Continue psychotherapy group to encourage Gallo to explore stressors and coping    Tx Plan Objective: 1 1, 1 2, Therapist:  Roseanna AGUILAR

## 2018-06-20 NOTE — PSYCH
Subjective:     Patient ID: Doss Frankel is a 56 y o  male  [facility] Clinical Progress Notes      Specialized Services Documentation  Therapist must complete separate progress note for each specific clinical activity in which the individual participated during the day  Group Psychotherapy  0930-1030  Aubree Kerns was excused from Wellness Group today due to psychiatric evaluation  Therapist:  Francis Fontaine RN              Other - Insurance called to verify that no pre-certification was required (as this CM was advised) waiting for return call  Case Management Note  [id number]  Gallo met with this CM to complete initial evaluation  ROIs was signed for his emergency contacts; wife Mckenna Paul and daughter Tami Bill  Crisis/contact card was given and explained  Aubree Kerns has no PCP and list of Francine Haq PCP was given for him to acquire a PCP  Aubree Kerns stated that he has no outpatient providers but may want an outpatient therapist and may want to attend OP support group in [facility] for depression  He was given phone number to contact his insurance company to request a list of OP therapists currently in his network and he was given all handouts for DBSA/Depression support groups and JA support group  Initial psychosocial evaluation completed and initial treatment plan goals discussion  Francis Fontaine RN    Current suicide risk : Low     Medications changes/added/denied? No    Treatment session number: 1    Individual Case Management Visit provided today?  Yes     [facility] follow up physician's orders: Ad[facility] to [facility]
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
## 2018-06-21 ENCOUNTER — DOCUMENTATION (OUTPATIENT)
Dept: PSYCHOLOGY | Facility: CLINIC | Age: 48
End: 2018-06-21

## 2018-06-22 NOTE — PROGRESS NOTES
0900 Gallo was no call and no show to Program again today  He has been instructed by this CM that he is responsible to call and cancel if he cannot attend Program and that he cannot tell Arminda Shah  he is not coming to Program  This morning [id number] [facility]  stated that when he went to Gallo's home to pick him up Villafuerte told him that he had been up until 2AM and was too tired to attend Program today   MELISSA Swain RN

## 2018-06-25 ENCOUNTER — OFFICE VISIT (OUTPATIENT)
Dept: PSYCHOLOGY | Facility: CLINIC | Age: 48
End: 2018-06-25
Payer: COMMERCIAL

## 2018-06-25 DIAGNOSIS — F32.2 SEVERE MAJOR DEPRESSION WITHOUT PSYCHOTIC FEATURES (HCC): Primary | ICD-10-CM

## 2018-06-25 PROCEDURE — G0177 OPPS/PHP; TRAIN & EDUC SERV: HCPCS

## 2018-06-25 PROCEDURE — G0410 GRP PSYCH PARTIAL HOSP 45-50: HCPCS

## 2018-06-25 PROCEDURE — G0176 OPPS/PHP;ACTIVITY THERAPY: HCPCS

## 2018-06-25 NOTE — PSYCH
Subjective:     Patient ID: Maciel Sandesr is a 56 y o  male  [facility] Clinical Progress Notes      Specialized Services Documentation  Therapist must complete separate progress note for each specific clinical activity in which the individual participated during the day  Group Psychotherapy  [id number]  Gallo participated in [facility] group focused on weight gain or loss related to medications and other factors such as not recognizing the difference between hunger and cravings     Gallo shared that he takes only PRN Atarax so he does not see an increase in his weight  He stated he drinks Coca Cola in the morning for the caffeine as he does not drink coffee  He also stated that he drinks more soda when he is at home than at work because "my wife drinks soda all day long with caffeine in it " He stated that he is very cautious to drink water and Gatorade while working as he is a  and can get dehydrated very quickly  He understands that there is also sugar in Gatorade he stated and that "I need to water it down "  Ronit Stevens stated that he does not have a healthy diet because "I eat a lot of junk foods with sugar " He stated that "I could eat less sugar and cook more because I like to cook " "I also need to cut down on soda " Ronit Stevens made moderate beginning progress toward goals  Continue to provide group to educate on adopting healthy nutritional regimes when on or off medications and in recovery to improve overall health and well-being  1 1,1 2,1 4   Tx Plan Objective: Therapist:  Fredrick Estrada RN                  Other (See Below)    Case Management Note  [id number]   Ronit Stevens met with this CM to request work excuse letter and also requested that this CM call his HR department with him to give them [facility] fax number so they can send short-term disability paperwork to be completed  (NICKY signed) Initial treatment plan was reviewed and signed and Ronit Stevens received a copy of same   Reviewed attendance policy as Ronit Stevens only attended one [facility] session after his initial day, without approved absence  He was informed that he has two no call and no show appointments after admission, and if he no calls and no shows again he will be terminated from Program  He stated that he understood he needs to also call [facility] Program phone number and not to cancel by telling Pedrito Spence  he is not coming to [address] [place] [address] denied SI and HI as well as any psychotic or manic symptoms  Estrellita Devine RN    Current suicide risk : Low     Medications changes/added/denied? No    Treatment session number: 2    Individual Case Management Visit provided today?  Yes     [facility] follow up physician's orders: N/A

## 2018-06-25 NOTE — PSYCH
Subjective:     Patient ID: Clarita Fung is a 56 y o  male  [facility] Clinical Progress Notes      Specialized Services Documentation  Therapist must complete separate progress note for each specific clinical activity in which the individual participated during the day  Group Psychotherapy  ([id number]) Alexis Ibarra participated in psychotherapy group focused on self-care  Alexis Ibarra identified trying to communicate more openly with his wife and not argue as a stressor  He actively engaged in group discussion, encouraging a peer to recognize that her self-care is priority, because if she is overwhelmed and unable to function, she cannot provide the care she wants to for her children anyway  Alexis Ibarra expressed being able to relate to this topic, and that he is beginning to put his self-care first so that he can be a support to other people around him  Moderate progress noted toward goals  Continue psychotherapy group to encourage Gallo to explore stressors and coping    Tx Plan Objective: 1 1, 1 2, Therapist:  Madonna Nageotte MSW

## 2018-06-25 NOTE — PSYCH
Subjective:     Patient ID: Tonia Mayfield is a 56 y o  male  [facility] Clinical Progress Notes      Specialized Services Documentation  Therapist must complete separate progress note for each specific clinical activity in which the individual participated during the day  Allied Therapy   [id number] Shari Hassan actively shared in [facility] group focused on DBT skill wise mind  He engaged in tasks exploring differences between reasonable and emotion mind and ways to get to wise mind  Group explored the benefits of mindfulness and practiced ways to slow down to begin to develop wise mind  He identified living more in emotion mind and challenges this has created  Group reinforced role of participating with wise mind in order to prevent getting stuck in the past or fears of the future  Some beginning effort toward treatment goal noted  Continue AT to encourage healthy skill development and practice of explored strategies       Tx Plan Objective: 1 1, Therapist:  Adis DEE    Education Therapy   Time:  1232-1957  Previous goal met: Yes   Readiness to Learning: Receptive  Barriers to Learning: None  Learning Assessment  Time: 6722-2280  Education Completed: Illness and Wellness Tools  Teaching Method: Verbal, Written and Demonstration  Shared Area of Learning: Yes   Goal Set: make a list of items to work on for his marriage  Tx Plan Objective: 1 1, Therapist:  Adis DEE

## 2018-06-26 ENCOUNTER — OFFICE VISIT (OUTPATIENT)
Dept: PSYCHOLOGY | Facility: CLINIC | Age: 48
End: 2018-06-26
Payer: COMMERCIAL

## 2018-06-26 DIAGNOSIS — F32.2 SEVERE MAJOR DEPRESSION WITHOUT PSYCHOTIC FEATURES (HCC): Primary | ICD-10-CM

## 2018-06-26 PROCEDURE — G0410 GRP PSYCH PARTIAL HOSP 45-50: HCPCS

## 2018-06-26 PROCEDURE — G0176 OPPS/PHP;ACTIVITY THERAPY: HCPCS

## 2018-06-26 PROCEDURE — G0177 OPPS/PHP; TRAIN & EDUC SERV: HCPCS

## 2018-06-26 NOTE — PSYCH
Subjective:     Patient ID: Bubba Dinh is a 56 y o  male  [facility] Clinical Progress Notes      Specialized Services Documentation  Therapist must complete separate progress note for each specific clinical activity in which the individual participated during the day  Group Psychotherapy [id number]  Gayle Woodard participated in [address] [facility] group focused on talking about your mental health diagnoses with supports including;  friends, family, peers, or others  Gayle Woodard was attentive to education, handouts and peer discussion but did not share his own experiences of building supports for recovery acknowledging that he needs more than his wife as support  He made mild progress toward goals  Continue to offer group to explore challenges on what  you feel comfortable in sharing (good boundaries) with supports, and other individuals,  and how to handle educating and sharing information on your diagnosis and treatment to build healthy, strong support for recovery  Tx Plan Objective: 1 1,1 2,1 4   Therapist:  Kelly Galloway RN                    Case Management Note [id number]  Gayle Woodard met with this CM/RN who he requested assist him in completing STD paperwork today to be faxed from [facility] tomorrow to his HR employer's dept  (NICKY signed) He completed paperwork that will be reviewed and signed by Program Psychiatrist tomorrow morning, then faxed  Gayle Woodard will receive a copy of same  He stated that he would like to return to couples counselor that he, and his wife, had made an appointment with before he was hospitalized inpatient   He stated that he called this counselor but has not received a return call as yet  Gayle Woodard stated that his wife has a book of [facility] providers that she is in the process of contacting in case this counselor does not contact them back  Gayle Woodard stated that he has only taken six Atarax since it was prescribed   He stated that he does not need an OP psychiatrist  Gayle Woodard stated that he does not have a PCP and was given a list of PCP for [facility] accepting new patients and he was encouraged to make an appointment with a new PCP as he stated that he has not "been to the PCP in a long time " Hygiene remains poor  Toni James denied SI and HI as well as any manic or psychotic symptoms  Edith Moreland RN    Current suicide risk : Low     Medications changes/added/denied? No    Treatment session number: 3    Individual Case Management Visit provided today?  Yes     [facility] follow up physician's orders: N/A

## 2018-06-26 NOTE — PSYCH
Subjective:     Patient ID: Jc Mcneil is a 56 y o  male  [facility] Clinical Progress Notes      Specialized Services Documentation  Therapist must complete separate progress note for each specific clinical activity in which the individual participated during the day  Allied Therapy   5094-1708 Gallo actively shared in [facility] group focused on distress tolerance and anxiety reduction  He was observed to be engaged in therapist led relaxation exercises including breath counting and visualization  Group discussed specific items that could help self soothe if in an anxiety crisis with encouragement to use these things regularly to manage stressors consistently  He identified he could put pictures of family in his relaxation kit  Some effort noted toward tx goal   Continue AT to encourage development of wellness skills and consistent practice      Tx Plan Objective: 1 1, Therapist:  Susannah DEE    Education Therapy   Time:  9226-6661  Previous goal met: Yes   Readiness to Learning: Receptive  Barriers to Learning: None  Learning Assessment  Time: 7509-1068  Education Completed: Illness and Wellness Tools  Teaching Method: Verbal and Demonstration  Shared Area of Learning: Yes   Goal Set: enjoy Anniversary  Tx Plan Objective: 1 1, Therapist:  Susannah DEE

## 2018-06-26 NOTE — PSYCH
Subjective:     Patient ID: Moris Monae is a 56 y o  male  [facility] Clinical Progress Notes      Specialized Services Documentation  Therapist must complete separate progress note for each specific clinical activity in which the individual participated during the day  Group Psychotherapy   ([phone]) Ashia Haynes participated in psychotherapy group focused on making difficult life decisions  Ashia Haynes identified hoping to have a good anniversary with his wife today as a stressor  He actively engaged in group discussion, talking about having had to make difficult decisions in his life, and looking at the pros and cons of each choice when trying to make his decisions  Group discussed the process of making difficult life decisions, and how the use of pro and con lists can be helpful in determining the better choice  Some moderate progress noted toward goals  Continue psychotherapy group to encourage Gallo to explore stressors and coping    Tx Plan Objective: 1 1, 1 2, Therapist:  Tiki AGUILAR

## 2018-06-27 ENCOUNTER — DOCUMENTATION (OUTPATIENT)
Dept: PSYCHOLOGY | Facility: CLINIC | Age: 48
End: 2018-06-27

## 2018-06-27 NOTE — PROGRESS NOTES
0800 Gallo called and spoke with this  stating that he will not be attending [facility] today as he had "family problems" he is addressing  He stated that he and his wife had an argument last evening and he called and spoke with [facility] [place] and this helped but he stated that he has not resolved the problems with his wife as yet  Discussed need to attend Program to address his issues in Program  E MANUEL Swain    STD paperwork completed, faxed (NICKY signed) as per patient's request, to his HR dept /[email or web address] ,  Attention: Velta Boys and scanned into medical record today  [phone]   Ora Calvillo RN

## 2018-06-28 ENCOUNTER — DOCUMENTATION (OUTPATIENT)
Dept: PSYCHOLOGY | Facility: CLINIC | Age: 48
End: 2018-06-28

## 2018-06-28 NOTE — PROGRESS NOTES
[facility] Discharge Instructions:     Disposition: home     Address: [address]      Diagnosis: MDD (major depressive disorder), single episode, severe , no psychosis ([address] )     Allergies (Drug/Food): No Known Allergies     Activity: No change in activity     Diet:no recommendations     Smoking Cessation: The best thing you can do to improve your health is to stop using tobacco     Diagnostic/Laboratory Orders: N/A      Follow-up appointments/Referrals: Author Proctor stated that he did not want a referral for medication monitoring as he does not want to take medication  List of PCP's in [address] [place] [address] accepting new patients was given previously since Author Proctor stated he did not have a PCP  Referral for couples therapy was given to Author Proctor per his request: He will contact directly: [facility] [address]  - Phone [phone]  His wife also has a list of couples' counselors provided by their insurance that she is currently contacting potential providers from  ICM/CTT: DBSA/Depression and JA support group handouts and information provided  Intake/Referral/Evaluation (Non-Emergency) *NON INSURED FOR FUNDING: [place]: [phone]     Intervention (Emergency) [address]: [facility]: [phone]    National Suicide Crisis Hotline: 8-979.482.1340    I, the undersigned, have received and understand the above instructions          Patient/Rep Signature: __________________________________       Date/Time: ______________         Relationship: __________________________________________       Date/Time: ______________         Physician Signature: ____________________________________      Date/Time: ______________               Signature: ________________________________       Date/Time: ______________

## 2018-06-28 NOTE — PROGRESS NOTES
0900 Gallo is no call and no show to Program again today  Phone call made to inform Kathy Preciado that if he does not attend [facility]  He will be discharged AMA since his attendance has been erratic   E MANUEL Swain

## 2018-06-29 ENCOUNTER — DOCUMENTATION (OUTPATIENT)
Dept: PSYCHOLOGY | Facility: CLINIC | Age: 48
End: 2018-06-29

## 2018-06-29 NOTE — PROGRESS NOTES
0900 Gallo no showed and no called today  Dr Alma Delia Vásquez signed discharge AMA today   E Brynn RN

## 2018-06-29 NOTE — PROGRESS NOTES
Subjective:     Patient ID: Erna Johns is a 56 y o  male  [facility] Discharge Summary:     Admission Date: 6/20/18    Patient was referred by: Avila Romero [facility]     Discharge Date: 6/29/18    Was this a routine discharge? no - D/C AMA     Diagnosis: Axis I:  MDD (major depressive disorder), single episode, severe , no psychosis ([address] )     Treating Physician: Dr Abhishek Iniguez MD    Treatment Complications: Rosy Chan attended three days at [facility]  He no called and no showed several times and this CM reached out to him but he did not return to Program  Resources were given for couples therapy as Rosy Chan had stated that he felt his issue was primarily marital problems  Presenting Need: Erna Johns is a 56 y o  male with only  history of anger management issues as a teenager and reports recent depression, anxiety off and on for the past 2 months came referred from [address] [place] [address] where he was admitted from  6/11/2018 to 6/15/ 2018 due to depression, anxiety, suicidal threats after he sent a text message to his girlfriend stating that [facility] purchased a gun and he might do something stupid like his  cousin did " Onset of symptoms was tow  months ago with gradually worsening course since that time  Psychosocial Stressors: separation from his wife, relationship problems, recent move and wife blocked his phone, took his truck, and refuses to see him since 6/6/2018  He states that  his relationship with his wife has been "falling apart" for the last 6 months, she blamed him for many things that happen but he claim he did not do any of them  He states is disappointed about her behavior and feels sad because his 1 year anniversary will be in 6 days and he missed his wife  Jaymie Valladares feels depressed, denies any suicidal or homicidal thoughts, plan or intent, denies any psychotic symptoms, denies any manic episodes  His PHQ-9 is 7        Course of treatment includes: group counseling, medication management, individual case management, allied therapy, psychoeducation and psychiatric evaluation     Treatment Progress: Kelin Wright attended three treatment days and was able to discuss challenges he has communicating with his wife, trust issues, and his fear of their marriage ending  He did not stay long enough to learn and practice CBT strategies for his own mental health and wellness  He denied suicidal and homicidal ideation during these days in Program  He refused medication but did have PRN Atarax for anxiety that he stated he did not take  He stated he no longer uses [facility]  Aftercare recommendations include: Kelin Wright was given resources to follow up with marital counseling       Discharge Medications include:  Current Outpatient Prescriptions:     hydrOXYzine HCL (ATARAX) 25 mg tablet, Take 1 tablet (25 mg total) by mouth every 6 (six) hours as needed for anxiety for up to 30 days, Disp: 120 tablet, Rfl: 0    nicotine (NICODERM CQ) 21 mg/24 hr TD 24 hr patch, Place 1 patch on the skin daily for 28 days, Disp: 28 patch, Rfl: 0

## 2020-11-26 NOTE — CASE MANAGEMENT
Pt did express insight regarding his threats via text in stating he does have a tumultuous relationship with his wife, that she is abusing drugs and he cheated on her recently because he "thought it was over" which she did not react well to  Pt admits to agitation and impulsivity and inner anger at the time of the text he sent but denies that he was truly suicidal/a threat to himself because he "talked it through" with his friend  Pt states he still wants his wife back, he's not sure if it'll happen, that he thinks he can deal with it if doesn't happen and states he'll talk to someone if he ever feels suicidal  Pt states he is open to [facility] and therapy  States he was an addict for many years and he does not want to rely upon any substance  Chest pain, unspecified type

## 2022-01-26 ENCOUNTER — HOSPITAL ENCOUNTER (EMERGENCY)
Facility: HOSPITAL | Age: 52
Discharge: HOME | End: 2022-01-26
Attending: EMERGENCY MEDICINE

## 2022-01-26 VITALS
RESPIRATION RATE: 20 BRPM | SYSTOLIC BLOOD PRESSURE: 125 MMHG | OXYGEN SATURATION: 98 % | TEMPERATURE: 97.6 F | HEART RATE: 66 BPM | BODY MASS INDEX: 23.43 KG/M2 | WEIGHT: 167.99 LBS | DIASTOLIC BLOOD PRESSURE: 64 MMHG

## 2022-01-26 DIAGNOSIS — S86.891A RIGHT MEDIAL TIBIAL STRESS SYNDROME, INITIAL ENCOUNTER: Primary | ICD-10-CM

## 2022-01-26 PROCEDURE — 99284 EMERGENCY DEPT VISIT MOD MDM: CPT | Performed by: EMERGENCY MEDICINE

## 2022-01-26 PROCEDURE — 99283 EMERGENCY DEPT VISIT LOW MDM: CPT

## 2022-01-26 RX ORDER — ACETAMINOPHEN 325 MG/1
975 TABLET ORAL ONCE
Status: COMPLETED | OUTPATIENT
Start: 2022-01-26 | End: 2022-01-26

## 2022-01-26 RX ADMIN — ACETAMINOPHEN 975 MG: 325 TABLET, FILM COATED ORAL at 05:52

## 2022-01-26 NOTE — ED PROVIDER NOTES
History  Chief Complaint   Patient presents with    Ankle Pain     Pt notes pain to right ankle due to walking around so much  Pt denies injury  History provided by:  Patient   used: No    Ankle Pain  Location:  Leg  Time since incident:  2 days  Injury: no    Leg location:  R lower leg  Pain details:     Quality:  Shooting    Radiates to:  Does not radiate    Severity:  Severe    Onset quality:  Gradual    Timing:  Constant    Progression:  Worsening  Chronicity:  New  Dislocation: no    Prior injury to area:  No  Relieved by:  Nothing  Worsened by:  Bearing weight and activity  Ineffective treatments:  NSAIDs  Risk factors: no concern for non-accidental trauma, no frequent fractures and no obesity        Prior to Admission Medications   Prescriptions Last Dose Informant Patient Reported? Taking?   hydrOXYzine HCL (ATARAX) 25 mg tablet   No No   Sig: Take 1 tablet (25 mg total) by mouth every 6 (six) hours as needed for anxiety for up to 30 days   nicotine (NICODERM CQ) 21 mg/24 hr TD 24 hr patch   No No   Sig: Place 1 patch on the skin daily for 28 days      Facility-Administered Medications: None       History reviewed  No pertinent past medical history  Past Surgical History:   Procedure Laterality Date    EYE SURGERY Left     HERNIA REPAIR         Family History   Problem Relation Age of Onset    Alcohol abuse Father     Alcohol abuse Brother     Alcohol abuse Cousin     Drug abuse Cousin     Completed Suicide  Cousin     Bipolar disorder Daughter      I have reviewed and agree with the history as documented      E-Cigarette/Vaping     E-Cigarette/Vaping Substances     Social History     Tobacco Use    Smoking status: Current Every Day Smoker     Packs/day: 2 00     Years: 32 00     Pack years: 64 00    Smokeless tobacco: Never Used   Substance Use Topics    Alcohol use: No     Comment: 20 years clean from drugs and alcohol    Drug use: No     Comment: 20 years clean from drugs and alcohol       Review of Systems   Constitutional: Negative  HENT: Negative  Respiratory: Negative  Cardiovascular: Negative  Gastrointestinal: Negative  Genitourinary: Negative  Musculoskeletal: Positive for arthralgias  Negative for gait problem and joint swelling  Neurological: Negative  All other systems reviewed and are negative  Physical Exam  Physical Exam  Constitutional:       General: He is not in acute distress  Appearance: He is well-developed  He is not diaphoretic  HENT:      Head: Normocephalic and atraumatic  Right Ear: External ear normal       Left Ear: External ear normal       Nose: Nose normal    Eyes:      General: No scleral icterus  Right eye: No discharge  Left eye: No discharge  Extraocular Movements: Extraocular movements intact  Conjunctiva/sclera: Conjunctivae normal    Cardiovascular:      Rate and Rhythm: Normal rate and regular rhythm  Pulses:           Dorsalis pedis pulses are 2+ on the right side  Heart sounds: Normal heart sounds  Pulmonary:      Effort: Pulmonary effort is normal       Breath sounds: Normal breath sounds  Musculoskeletal:         General: Normal range of motion  Cervical back: Normal range of motion and neck supple  Right knee: Normal       Right lower leg: Tenderness present  No swelling, deformity, lacerations or bony tenderness  No edema  Right ankle: Normal         Legs:    Skin:     General: Skin is warm and dry  Neurological:      General: No focal deficit present  Mental Status: He is alert and oriented to person, place, and time  Mental status is at baseline     Psychiatric:         Mood and Affect: Mood normal          Behavior: Behavior normal          Vital Signs  ED Triage Vitals   Temperature Pulse Respirations Blood Pressure SpO2   01/26/22 0538 01/26/22 0536 01/26/22 0536 01/26/22 0536 01/26/22 0536   97 6 °F (36 4 °C) 66 20 125/64 98 % Temp Source Heart Rate Source Patient Position - Orthostatic VS BP Location FiO2 (%)   01/26/22 0538 01/26/22 0536 01/26/22 0536 01/26/22 0536 --   Oral Monitor Sitting Right arm       Pain Score       01/26/22 0552       9           Vitals:    01/26/22 0536   BP: 125/64   Pulse: 66   Patient Position - Orthostatic VS: Sitting         Visual Acuity      ED Medications  Medications   acetaminophen (TYLENOL) tablet 975 mg (975 mg Oral Given 1/26/22 3935)       Diagnostic Studies  Results Reviewed     None                 No orders to display              Procedures  Procedures         ED Course                               SBIRT 22yo+      Most Recent Value   SBIRT (23 yo +)    In order to provide better care to our patients, we are screening all of our patients for alcohol and drug use  Would it be okay to ask you these screening questions? No Filed at: 01/26/2022 0543                    [facility]  Number of Diagnoses or Management Options  Right medial tibial stress syndrome, initial encounter: new and does not require workup  Risk of Complications, Morbidity, and/or Mortality  Presenting problems: low  Management options: low    Patient Progress  Patient progress: stable      Disposition  Final diagnoses:   Right medial tibial stress syndrome, initial encounter     Time reflects when diagnosis was documented in both MDM as applicable and the Disposition within this note     Time User Action Codes Description Comment    1/26/2022  5:45 AM Maira Salazar Add [[id number]] Right medial tibial stress syndrome, initial encounter       ED Disposition     ED Disposition Condition Date/Time Comment    Discharge Good Wed Jan 26, 2022  5:45 AM Haven Tam discharge to home/self care              Follow-up Information     Follow up With Specialties Details Why Contact Info Additional [address] [facility] Schedule an appointment as soon as possible for a visit today  [phone], [address] [facility] [address]  [address], [address], [address], [address]          Patient's Medications   Discharge Prescriptions    No medications on file       No discharge procedures on file      PDMP Review     None          ED Provider  Electronically Signed by           Phuong Terrell PA-C  01/26/22 7474

## 2022-01-26 NOTE — DISCHARGE INSTRUCTIONS
Leg Pain   WHAT YOU NEED TO KNOW:   Leg pain may be caused by a variety of health conditions  Your tests did not show any broken bones or blood clots  DISCHARGE INSTRUCTIONS:   Return to the emergency department if:   · You have a fever  · Your leg starts to swell  · Your leg pain gets worse  · You have numbness or tingling in your leg or toes  · You cannot put any weight on or move your leg  Contact your healthcare provider if:   · Your pain does not decrease, even after treatment  · You have questions or concerns about your condition or care  Medicines:   · NSAIDs , such as ibuprofen, help decrease swelling, pain, and fever  This medicine is available with or without a doctor's order  NSAIDs can cause stomach bleeding or kidney problems in certain people  If you take blood thinner medicine, always ask your healthcare provider if NSAIDs are safe for you  Always read the medicine label and follow directions  · Take your medicine as directed  Contact your healthcare provider if you think your medicine is not helping or if you have side effects  Tell him of her if you are allergic to any medicine  Keep a list of the medicines, vitamins, and herbs you take  Include the amounts, and when and why you take them  Bring the list or the pill bottles to follow-up visits  Carry your medicine list with you in case of an emergency  Follow up with your healthcare provider as directed: You may need more tests to find the cause of your leg pain  You may need to see an orthopedic specialist or a physical therapist  Write down your questions so you remember to ask them during your visits  Manage your leg pain:   · Rest  your injured leg so that it can heal  You may need an immobilizer, brace, or splint to limit the movement of your leg  You may need to avoid putting any weight on your leg for at least 48 hours  Return to normal activities as directed      · Ice  the injury for 20 minutes every 4 hours for up to 24 hours, or as directed  Use an ice pack, or put crushed ice in a plastic bag  Cover it with a towel to protect your skin  Ice helps prevent tissue damage and decreases swelling and pain  · Elevate  your injured leg above the level of your heart as often as you can  This will help decrease swelling and pain  If possible, prop your leg on pillows or blankets to keep the area elevated comfortably  · Use assistive devices as directed  You may need to use a cane or crutches  Assistive devices help decrease pain and pressure on your leg when you walk  Ask your healthcare provider for more information about assistive devices and how to use them correctly  · Maintain a healthy weight  Extra body weight can cause pressure and pain in your hip, knee, and ankle joints  Ask your healthcare provider how much you should weigh  Ask him to help you create a weight loss plan if you are overweight  © Copyright [employer] 2021 Information is for End User's use only and may not be sold, redistributed or otherwise used for commercial purposes  All illustrations and images included in CareNotes® are the copyrighted property of [employer]  or [facility] [facility] [place]   The above information is an  only  It is not intended as medical advice for individual conditions or treatments  Talk to your doctor, nurse or pharmacist before following any medical regimen to see if it is safe and effective for you

## 2025-05-29 ENCOUNTER — VBI (OUTPATIENT)
Dept: ADMINISTRATIVE | Facility: OTHER | Age: 55
End: 2025-05-29

## 2025-05-29 NOTE — TELEPHONE ENCOUNTER
05/29/25 2:01 PM     Chart reviewed for [facility]: Colonoscopy ; nothing is submitted to the patient's insurance at this time.     Yolanda Johansen   [facility]